# Patient Record
Sex: MALE | Employment: OTHER | URBAN - NONMETROPOLITAN AREA
[De-identification: names, ages, dates, MRNs, and addresses within clinical notes are randomized per-mention and may not be internally consistent; named-entity substitution may affect disease eponyms.]

---

## 2017-03-22 LAB
BASOPHILS ABSOLUTE: ABNORMAL /ΜL
BASOPHILS RELATIVE PERCENT: ABNORMAL %
BUN BLDV-MCNC: 33 MG/DL
CALCIUM SERPL-MCNC: 9.1 MG/DL
CHLORIDE BLD-SCNC: 104 MMOL/L
CO2: 29 MMOL/L
CREAT SERPL-MCNC: 1.36 MG/DL
EOSINOPHILS ABSOLUTE: ABNORMAL /ΜL
EOSINOPHILS RELATIVE PERCENT: ABNORMAL %
GFR CALCULATED: 49
GLUCOSE BLD-MCNC: 128 MG/DL
HCT VFR BLD CALC: 39.7 % (ref 41–53)
HEMOGLOBIN: 13.2 G/DL (ref 13.5–17.5)
LYMPHOCYTES ABSOLUTE: ABNORMAL /ΜL
LYMPHOCYTES RELATIVE PERCENT: ABNORMAL %
MCH RBC QN AUTO: ABNORMAL PG
MCHC RBC AUTO-ENTMCNC: ABNORMAL G/DL
MCV RBC AUTO: ABNORMAL FL
MONOCYTES ABSOLUTE: ABNORMAL /ΜL
MONOCYTES RELATIVE PERCENT: ABNORMAL %
NEUTROPHILS ABSOLUTE: ABNORMAL /ΜL
NEUTROPHILS RELATIVE PERCENT: ABNORMAL %
PLATELET # BLD: 199 K/ΜL
PMV BLD AUTO: ABNORMAL FL
POTASSIUM SERPL-SCNC: 4.3 MMOL/L
RBC # BLD: 4.68 10^6/ΜL
SODIUM BLD-SCNC: 135 MMOL/L
WBC # BLD: 5.88 10^3/ML

## 2017-04-03 ENCOUNTER — OFFICE VISIT (OUTPATIENT)
Dept: NEPHROLOGY | Age: 82
End: 2017-04-03

## 2017-04-03 VITALS
HEIGHT: 67 IN | DIASTOLIC BLOOD PRESSURE: 60 MMHG | SYSTOLIC BLOOD PRESSURE: 118 MMHG | HEART RATE: 62 BPM | RESPIRATION RATE: 18 BRPM | WEIGHT: 144 LBS | BODY MASS INDEX: 22.6 KG/M2 | OXYGEN SATURATION: 96 %

## 2017-04-03 DIAGNOSIS — N18.30 CKD (CHRONIC KIDNEY DISEASE) STAGE 3, GFR 30-59 ML/MIN (HCC): Primary | ICD-10-CM

## 2017-04-03 PROCEDURE — G8427 DOCREV CUR MEDS BY ELIG CLIN: HCPCS | Performed by: INTERNAL MEDICINE

## 2017-04-03 PROCEDURE — 4040F PNEUMOC VAC/ADMIN/RCVD: CPT | Performed by: INTERNAL MEDICINE

## 2017-04-03 PROCEDURE — 1036F TOBACCO NON-USER: CPT | Performed by: INTERNAL MEDICINE

## 2017-04-03 PROCEDURE — G8419 CALC BMI OUT NRM PARAM NOF/U: HCPCS | Performed by: INTERNAL MEDICINE

## 2017-04-03 PROCEDURE — 99213 OFFICE O/P EST LOW 20 MIN: CPT | Performed by: INTERNAL MEDICINE

## 2017-04-03 PROCEDURE — 1123F ACP DISCUSS/DSCN MKR DOCD: CPT | Performed by: INTERNAL MEDICINE

## 2017-04-03 PROCEDURE — G8598 ASA/ANTIPLAT THER USED: HCPCS | Performed by: INTERNAL MEDICINE

## 2017-04-03 RX ORDER — ISOSORBIDE MONONITRATE 120 MG/1
120 TABLET, EXTENDED RELEASE ORAL DAILY
Refills: 4 | COMMUNITY
Start: 2017-03-12

## 2017-05-15 RX ORDER — POTASSIUM CITRATE 10 MEQ/1
TABLET, EXTENDED RELEASE ORAL
Qty: 60 TABLET | Refills: 3 | Status: SHIPPED | OUTPATIENT
Start: 2017-05-15 | End: 2017-05-15 | Stop reason: SDUPTHER

## 2017-05-17 RX ORDER — POTASSIUM CITRATE 10 MEQ/1
TABLET, EXTENDED RELEASE ORAL
Qty: 60 TABLET | Refills: 3 | Status: SHIPPED | OUTPATIENT
Start: 2017-05-17 | End: 2018-01-03 | Stop reason: SDUPTHER

## 2017-09-19 LAB
BASOPHILS ABSOLUTE: ABNORMAL /ΜL
BASOPHILS RELATIVE PERCENT: ABNORMAL %
BUN BLDV-MCNC: 32 MG/DL
CALCIUM SERPL-MCNC: 8.9 MG/DL
CHLORIDE BLD-SCNC: 106 MMOL/L
CO2: 28 MMOL/L
CREAT SERPL-MCNC: 1.39 MG/DL
EOSINOPHILS ABSOLUTE: ABNORMAL /ΜL
EOSINOPHILS RELATIVE PERCENT: ABNORMAL %
GFR CALCULATED: 48
GLUCOSE BLD-MCNC: 150 MG/DL
HCT VFR BLD CALC: 38.6 % (ref 41–53)
HEMOGLOBIN: 13 G/DL (ref 13.5–17.5)
LYMPHOCYTES ABSOLUTE: ABNORMAL /ΜL
LYMPHOCYTES RELATIVE PERCENT: ABNORMAL %
MCH RBC QN AUTO: ABNORMAL PG
MCHC RBC AUTO-ENTMCNC: ABNORMAL G/DL
MCV RBC AUTO: ABNORMAL FL
MONOCYTES ABSOLUTE: ABNORMAL /ΜL
MONOCYTES RELATIVE PERCENT: ABNORMAL %
NEUTROPHILS ABSOLUTE: ABNORMAL /ΜL
NEUTROPHILS RELATIVE PERCENT: ABNORMAL %
PLATELET # BLD: 201 K/ΜL
PMV BLD AUTO: ABNORMAL FL
POTASSIUM SERPL-SCNC: 4.6 MMOL/L
RBC # BLD: 4.55 10^6/ΜL
SODIUM BLD-SCNC: 140 MMOL/L
WBC # BLD: 6.2 10^3/ML

## 2017-10-02 ENCOUNTER — OFFICE VISIT (OUTPATIENT)
Dept: NEPHROLOGY | Age: 82
End: 2017-10-02
Payer: MEDICARE

## 2017-10-02 VITALS
HEART RATE: 61 BPM | WEIGHT: 140 LBS | HEIGHT: 69 IN | RESPIRATION RATE: 18 BRPM | SYSTOLIC BLOOD PRESSURE: 130 MMHG | DIASTOLIC BLOOD PRESSURE: 68 MMHG | OXYGEN SATURATION: 97 % | BODY MASS INDEX: 20.73 KG/M2

## 2017-10-02 DIAGNOSIS — N18.30 CKD (CHRONIC KIDNEY DISEASE), STAGE III (HCC): Primary | ICD-10-CM

## 2017-10-02 PROCEDURE — 1036F TOBACCO NON-USER: CPT | Performed by: INTERNAL MEDICINE

## 2017-10-02 PROCEDURE — G8420 CALC BMI NORM PARAMETERS: HCPCS | Performed by: INTERNAL MEDICINE

## 2017-10-02 PROCEDURE — G8484 FLU IMMUNIZE NO ADMIN: HCPCS | Performed by: INTERNAL MEDICINE

## 2017-10-02 PROCEDURE — G8598 ASA/ANTIPLAT THER USED: HCPCS | Performed by: INTERNAL MEDICINE

## 2017-10-02 PROCEDURE — 4040F PNEUMOC VAC/ADMIN/RCVD: CPT | Performed by: INTERNAL MEDICINE

## 2017-10-02 PROCEDURE — 1123F ACP DISCUSS/DSCN MKR DOCD: CPT | Performed by: INTERNAL MEDICINE

## 2017-10-02 PROCEDURE — 99213 OFFICE O/P EST LOW 20 MIN: CPT | Performed by: INTERNAL MEDICINE

## 2017-10-02 PROCEDURE — G8427 DOCREV CUR MEDS BY ELIG CLIN: HCPCS | Performed by: INTERNAL MEDICINE

## 2017-10-02 NOTE — PROGRESS NOTES
[facility]    [address] [facility] [address]  [address], [address]  [phone]       Progress Note    10/2/2017 3:02 PM    Pt Name:    Aaron Castillo  YOB: 1935  Primary Care Physician:  Diann GONZALES       Chief Complaint:   Chief Complaint   Patient presents with    Chronic Kidney Disease     iii        History of Chief Complaint: CKD stage IIIA from DM and HTN     Subjective:  I last saw the patient in clinic 04/03/17. I follow the patient for Chronic Kidney disease stage IIIA. Since our last visit the patient has not been hospitalized. The patient is sleeping well at night with 1-2 times per night nocturia. The patient has a good appetite and is remaining active. The patient denied N/V/C/D/SOB/CP. EGFR=48 Ml/Min       Objective:  VITALS:  /68 (Site: Left Arm, Position: Sitting, Cuff Size: Small Adult)  Pulse 61  Resp 18  Ht 5' 9\" (1.753 m)  Wt 140 lb (63.5 kg)  SpO2 97%  BMI 20.67 kg/m2  Weight:   Wt Readings from Last 3 Encounters:   10/02/17 140 lb (63.5 kg)   04/03/17 144 lb (65.3 kg)   12/05/16 144 lb (65.3 kg)     Body mass index is 20.67 kg/(m^2). Physical examination    General:  Alert and cooperative with exam  HEENT:  Head: Normocephalic, no lesions, without obvious abnormality. Neck:   No JVD and no bruits. Thyroid gland is normal  Lungs:  clear to auscultation bilaterally  Heart:  regular rate and rhythm, S1, S2 normal, no murmur, click, rub or gallop  Abdomen:  soft, non-tender; bowel sounds normal; no masses,  no organomegaly  Extremities:  extremities normal, atraumatic, no cyanosis or edema  Neurologic:  Mental status: Alert, oriented, thought content appropriate  Skin:                Warm and dry with no rashes. Muscles:         Hand  and leg strength are equal and strong bilaterally.      Lab Data      CBC:   Lab Results   Component Value Date    WBC 6.20 09/19/2017    HGB 13.0 (A) 09/19/2017    HCT 38.6 (A) 09/19/2017     09/19/2017     BMP:    Lab Results   Component Value Date     09/19/2017     03/22/2017     11/17/2016    K 4.6 09/19/2017    K 4.3 03/22/2017    K 4.7 11/17/2016     09/19/2017     03/22/2017     11/17/2016    CO2 28 09/19/2017    CO2 29 03/22/2017    CO2 30 11/17/2016    BUN 32 09/19/2017    BUN 33 03/22/2017    BUN 27 11/17/2016    CREATININE 1.39 09/19/2017    CREATININE 1.36 03/22/2017    CREATININE 1.37 11/17/2016    GLUCOSE 150 09/19/2017    GLUCOSE 128 03/22/2017    GLUCOSE 198 11/17/2016      Hepatic:   Lab Results   Component Value Date    AST 16 11/20/2014    ALT 25 11/20/2014    BILITOT 0.3 11/20/2014    ALKPHOS 117 11/20/2014     BNP: No results found for: BNP  Lipids:   Lab Results   Component Value Date    CHOL 141 11/20/2014    HDL 50 11/20/2014     INR: No results found for: INR  URINE: No results found for: NAUR, PROTUR  No results found for: NITRU, COLORU, PHUR, LABCAST, WBCUA, RBCUA, MUCUS, TRICHOMONAS, YEAST, BACTERIA, CLARITYU, SPECGRAV, LEUKOCYTESUR, UROBILINOGEN, BILIRUBINUR, BLOODU, GLUCOSEU, KETUA, AMORPHOUS   Microalbumen/Creatinine ratio:  No components found for: RUCREAT        Medications:    Current Outpatient Prescriptions   Medication Sig Dispense Refill    potassium citrate (UROCIT-K) 10 MEQ (1080 MG) extended release tablet TAKE 1 TABLET BY MOUTH TWICE A DAY WITH MEALS 60 tablet 3    isosorbide mononitrate (IMDUR) 60 MG extended release tablet 60 mg daily  4    atenolol (TENORMIN) 25 MG tablet Take 25 mg by mouth 2 times daily       magnesium oxide (MAG-OX) 400 MG tablet Take 400 mg by mouth daily      glimepiride (AMARYL) 1 MG tablet Take 1 mg by mouth every morning (before breakfast)      oxybutynin (DITROPAN-XL) 10 MG CR tablet Take 10 mg by mouth daily      nitroGLYCERIN (NITROSTAT) 0.4 MG SL tablet Place 0.4 mg under the tongue every 5 minutes as needed for Chest pain      Cholecalciferol ([facility] VIT D 5000 HIGH-POTENCY PO) Take by

## 2017-10-02 NOTE — MR AVS SNAPSHOT
After Visit Summary             Quan Doyle   10/2/2017 2:20 PM   Office Visit    Description:  Male : 1935   Provider:  Lucinda Nolasco DO   Department:  [address] [place] [address] and Future Appointments         Below is a list of your follow-up and future appointments. This may not be a complete list as you may have made appointments directly with providers that we are not aware of or your providers may have made some for you. Please call your providers to confirm appointments. It is important to keep your appointments. Please bring your current insurance card, photo ID, co-pay, and all medication bottles to your appointment. If self-pay, payment is expected at the time of service. Your To-Do List     Future Appointments Provider Department Dept Phone    2018 1:40 PM [facility]  [phone]    Please arrive 15 minutes prior to appointment time, bring insurance card and photo ID. Please arrive 15 minutes prior to appointment time, bring insurance card and photo ID. Future Orders Complete By Expires    Basic Metabolic Panel [LHP90 Custom]  3/31/2018 10/3/2018    CBC Auto Differential [SOL9228 Custom]  3/31/2018 10/3/2018    Follow-Up    Return in about 6 months (around 2018).          Information from Your Visit        Department     Name Address Phone Fax    [address] [id number] [address] [phone]      You Were Seen for:         Comments    CKD (chronic kidney disease), stage III   [405094]         Vital Signs     Blood Pressure Pulse Respirations Height Weight Oxygen Saturation    130/68 (Site: Left Arm, Position: Sitting, Cuff Size: Small Adult) 61 18 5' 9\" (1.753 m) 140 lb (63.5 kg) 97%    Body Mass Index Smoking Status                20.67 kg/m2 Former Smoker             Medications and Orders      Your Current Medications Are potassium citrate (UROCIT-K) 10 MEQ (1080 MG) extended release tablet TAKE 1 TABLET BY MOUTH TWICE A DAY WITH MEALS    isosorbide mononitrate (IMDUR) 60 MG extended release tablet 60 mg daily    atenolol (TENORMIN) 25 MG tablet Take 25 mg by mouth 2 times daily     magnesium oxide (MAG-OX) 400 MG tablet Take 400 mg by mouth daily    glimepiride (AMARYL) 1 MG tablet Take 1 mg by mouth every morning (before breakfast)    oxybutynin (DITROPAN-XL) 10 MG CR tablet Take 10 mg by mouth daily    nitroGLYCERIN (NITROSTAT) 0.4 MG SL tablet Place 0.4 mg under the tongue every 5 minutes as needed for Chest pain    Cholecalciferol (CVS VIT D 5000 HIGH-POTENCY PO) Take by mouth daily    Multiple Vitamins-Minerals (MULTIVITAMIN PO) Take by mouth daily    pantoprazole (PROTONIX) 40 MG tablet Take 40 mg by mouth 2 times daily     aspirin 81 MG tablet Take 81 mg by mouth daily. loratadine (ALLERGY RELIEF) 10 MG tablet Take 10 mg by mouth daily. metFORMIN (GLUCOPHAGE) 850 MG tablet Take 850 mg by mouth 2 times daily (with meals). finasteride (PROSCAR) 5 MG tablet Take 1 tablet by mouth daily. ferrous sulfate 325 (65 FE) MG tablet Take 325 mg by mouth 2 times daily.     simvastatin (ZOCOR) 40 MG tablet Take 40 mg by mouth nightly    zinc 50 MG CAPS Take 50 mg by mouth daily    tamsulosin (FLOMAX) 0.4 MG capsule TAKE ONE CAPSULE BY MOUTH AT BEDTIME      Allergies              Doxycycline Monohydrate     Tetracycline     Tetracyclines & Related          Additional Information        Basic Information     Date Of Birth Sex Race Ethnicity Preferred Language    1935 Male Declined Declined English      Problem List as of 10/2/2017  Date Reviewed: 10/2/2017                CKD (chronic kidney disease), stage II    Hematuria    DM (diabetes mellitus) ([facility])    S/P appendectomy    Gastric ulcer    S/P rotator cuff repair    Smoking hx    H/O ventral hernia repair    Acute renal insufficiency    Hypoglycemia

## 2018-01-05 RX ORDER — POTASSIUM CITRATE 10 MEQ/1
TABLET, EXTENDED RELEASE ORAL
Qty: 60 TABLET | Refills: 3 | OUTPATIENT
Start: 2018-01-05 | End: 2018-05-02 | Stop reason: SDUPTHER

## 2018-03-27 LAB
BASOPHILS ABSOLUTE: ABNORMAL /ΜL
BASOPHILS RELATIVE PERCENT: ABNORMAL %
BUN BLDV-MCNC: 24 MG/DL
CALCIUM SERPL-MCNC: 8.8 MG/DL
CHLORIDE BLD-SCNC: 105 MMOL/L
CO2: 27 MMOL/L
CREAT SERPL-MCNC: 1.28 MG/DL
EOSINOPHILS ABSOLUTE: ABNORMAL /ΜL
EOSINOPHILS RELATIVE PERCENT: ABNORMAL %
GFR CALCULATED: 52
GLUCOSE BLD-MCNC: 133 MG/DL
HCT VFR BLD CALC: 38.8 % (ref 41–53)
HEMOGLOBIN: 12.8 G/DL (ref 13.5–17.5)
LYMPHOCYTES ABSOLUTE: ABNORMAL /ΜL
LYMPHOCYTES RELATIVE PERCENT: ABNORMAL %
MCH RBC QN AUTO: ABNORMAL PG
MCHC RBC AUTO-ENTMCNC: ABNORMAL G/DL
MCV RBC AUTO: ABNORMAL FL
MONOCYTES ABSOLUTE: ABNORMAL /ΜL
MONOCYTES RELATIVE PERCENT: ABNORMAL %
NEUTROPHILS ABSOLUTE: ABNORMAL /ΜL
NEUTROPHILS RELATIVE PERCENT: ABNORMAL %
PLATELET # BLD: 204 K/ΜL
PMV BLD AUTO: ABNORMAL FL
POTASSIUM SERPL-SCNC: 4.8 MMOL/L
RBC # BLD: 4.49 10^6/ΜL
SODIUM BLD-SCNC: 139 MMOL/L
WBC # BLD: 6.14 10^3/ML

## 2018-04-02 ENCOUNTER — OFFICE VISIT (OUTPATIENT)
Dept: NEPHROLOGY | Age: 83
End: 2018-04-02
Payer: MEDICARE

## 2018-04-02 VITALS
RESPIRATION RATE: 18 BRPM | DIASTOLIC BLOOD PRESSURE: 76 MMHG | HEIGHT: 69 IN | BODY MASS INDEX: 20.44 KG/M2 | HEART RATE: 67 BPM | WEIGHT: 138 LBS | SYSTOLIC BLOOD PRESSURE: 126 MMHG | OXYGEN SATURATION: 97 %

## 2018-04-02 DIAGNOSIS — N18.30 CKD (CHRONIC KIDNEY DISEASE), STAGE III (HCC): Primary | ICD-10-CM

## 2018-04-02 PROCEDURE — G8420 CALC BMI NORM PARAMETERS: HCPCS | Performed by: INTERNAL MEDICINE

## 2018-04-02 PROCEDURE — 99213 OFFICE O/P EST LOW 20 MIN: CPT | Performed by: INTERNAL MEDICINE

## 2018-04-02 PROCEDURE — G8598 ASA/ANTIPLAT THER USED: HCPCS | Performed by: INTERNAL MEDICINE

## 2018-04-02 PROCEDURE — G8428 CUR MEDS NOT DOCUMENT: HCPCS | Performed by: INTERNAL MEDICINE

## 2018-04-02 PROCEDURE — 1123F ACP DISCUSS/DSCN MKR DOCD: CPT | Performed by: INTERNAL MEDICINE

## 2018-04-02 PROCEDURE — 1036F TOBACCO NON-USER: CPT | Performed by: INTERNAL MEDICINE

## 2018-04-02 PROCEDURE — 4040F PNEUMOC VAC/ADMIN/RCVD: CPT | Performed by: INTERNAL MEDICINE

## 2018-04-02 RX ORDER — LORATADINE 10 MG/1
10 CAPSULE, LIQUID FILLED ORAL DAILY
COMMUNITY
End: 2019-07-17 | Stop reason: ALTCHOICE

## 2018-05-03 RX ORDER — POTASSIUM CITRATE 10 MEQ/1
TABLET, EXTENDED RELEASE ORAL
Qty: 60 TABLET | Refills: 3 | Status: SHIPPED | OUTPATIENT
Start: 2018-05-03 | End: 2018-08-31 | Stop reason: SDUPTHER

## 2018-06-08 LAB
CREATININE, URINE: NORMAL
MICROALBUMIN/CREAT 24H UR: 5.2 MG/G{CREAT}
MICROALBUMIN/CREAT UR-RTO: NORMAL

## 2018-09-05 RX ORDER — POTASSIUM CITRATE 10 MEQ/1
TABLET, EXTENDED RELEASE ORAL
Qty: 60 TABLET | Refills: 3 | Status: SHIPPED | OUTPATIENT
Start: 2018-09-05 | End: 2018-12-29 | Stop reason: SDUPTHER

## 2018-10-06 LAB
ALBUMIN SERPL-MCNC: 4 G/DL
ALP BLD-CCNC: 88 U/L
ALT SERPL-CCNC: 30 U/L
ANION GAP SERPL CALCULATED.3IONS-SCNC: NORMAL MMOL/L
AST SERPL-CCNC: 18 U/L
BILIRUB SERPL-MCNC: 0.2 MG/DL (ref 0.1–1.4)
BUN BLDV-MCNC: 35 MG/DL
CALCIUM SERPL-MCNC: 8.9 MG/DL
CHLORIDE BLD-SCNC: 102 MMOL/L
CHOLESTEROL, TOTAL: 133 MG/DL
CHOLESTEROL/HDL RATIO: NORMAL
CO2: 27 MMOL/L
CREAT SERPL-MCNC: 1.57 MG/DL
GFR CALCULATED: 43
GLUCOSE BLD-MCNC: 171 MG/DL
HDLC SERPL-MCNC: 58 MG/DL (ref 35–70)
LDL CHOLESTEROL CALCULATED: 75 MG/DL (ref 0–160)
POTASSIUM SERPL-SCNC: 5.2 MMOL/L
SODIUM BLD-SCNC: 136 MMOL/L
TOTAL PROTEIN: 6.3
TRIGL SERPL-MCNC: 89 MG/DL
VLDLC SERPL CALC-MCNC: 17 MG/DL

## 2018-11-29 LAB
BASOPHILS ABSOLUTE: ABNORMAL /ΜL
BASOPHILS RELATIVE PERCENT: ABNORMAL %
BUN BLDV-MCNC: 37 MG/DL
CALCIUM SERPL-MCNC: 8.6 MG/DL
CHLORIDE BLD-SCNC: 105 MMOL/L
CO2: 28 MMOL/L
CREAT SERPL-MCNC: 1.6 MG/DL
EOSINOPHILS ABSOLUTE: ABNORMAL /ΜL
EOSINOPHILS RELATIVE PERCENT: ABNORMAL %
GFR CALCULATED: 40
GLUCOSE BLD-MCNC: 194 MG/DL
HCT VFR BLD CALC: 39.1 % (ref 41–53)
HEMOGLOBIN: 12.8 G/DL (ref 13.5–17.5)
LYMPHOCYTES ABSOLUTE: ABNORMAL /ΜL
LYMPHOCYTES RELATIVE PERCENT: ABNORMAL %
MCH RBC QN AUTO: ABNORMAL PG
MCHC RBC AUTO-ENTMCNC: ABNORMAL G/DL
MCV RBC AUTO: ABNORMAL FL
MONOCYTES ABSOLUTE: ABNORMAL /ΜL
MONOCYTES RELATIVE PERCENT: ABNORMAL %
NEUTROPHILS ABSOLUTE: ABNORMAL /ΜL
NEUTROPHILS RELATIVE PERCENT: ABNORMAL %
PLATELET # BLD: 208 K/ΜL
PMV BLD AUTO: ABNORMAL FL
POTASSIUM SERPL-SCNC: 4.8 MMOL/L
RBC # BLD: 4.5 10^6/ΜL
SODIUM BLD-SCNC: 141 MMOL/L
WBC # BLD: 5.96 10^3/ML

## 2018-12-19 ENCOUNTER — OFFICE VISIT (OUTPATIENT)
Dept: NEPHROLOGY | Age: 83
End: 2018-12-19
Payer: MEDICARE

## 2018-12-19 VITALS
SYSTOLIC BLOOD PRESSURE: 138 MMHG | HEIGHT: 69 IN | DIASTOLIC BLOOD PRESSURE: 72 MMHG | HEART RATE: 61 BPM | RESPIRATION RATE: 18 BRPM | BODY MASS INDEX: 22.66 KG/M2 | WEIGHT: 153 LBS | OXYGEN SATURATION: 92 %

## 2018-12-19 DIAGNOSIS — N18.30 CKD (CHRONIC KIDNEY DISEASE), STAGE III (HCC): Primary | ICD-10-CM

## 2018-12-19 PROCEDURE — 99213 OFFICE O/P EST LOW 20 MIN: CPT | Performed by: INTERNAL MEDICINE

## 2018-12-19 PROCEDURE — G8484 FLU IMMUNIZE NO ADMIN: HCPCS | Performed by: INTERNAL MEDICINE

## 2018-12-19 PROCEDURE — 1101F PT FALLS ASSESS-DOCD LE1/YR: CPT | Performed by: INTERNAL MEDICINE

## 2018-12-19 PROCEDURE — G8420 CALC BMI NORM PARAMETERS: HCPCS | Performed by: INTERNAL MEDICINE

## 2018-12-19 PROCEDURE — 4040F PNEUMOC VAC/ADMIN/RCVD: CPT | Performed by: INTERNAL MEDICINE

## 2018-12-19 PROCEDURE — 1123F ACP DISCUSS/DSCN MKR DOCD: CPT | Performed by: INTERNAL MEDICINE

## 2018-12-19 PROCEDURE — 1036F TOBACCO NON-USER: CPT | Performed by: INTERNAL MEDICINE

## 2018-12-19 PROCEDURE — G8598 ASA/ANTIPLAT THER USED: HCPCS | Performed by: INTERNAL MEDICINE

## 2018-12-19 PROCEDURE — G8427 DOCREV CUR MEDS BY ELIG CLIN: HCPCS | Performed by: INTERNAL MEDICINE

## 2018-12-19 RX ORDER — LISINOPRIL 2.5 MG/1
2.5 TABLET ORAL DAILY
COMMUNITY
End: 2020-05-20

## 2018-12-19 NOTE — PROGRESS NOTES
[facility]    [address] [facility]  [address], [address]  [phone]       Progress Note    12/19/2018 11:25 AM    Pt Name:    Roxy Bains  YOB: 1935  Primary Care Physician:  Elbert GONZALES       Chief Complaint:   Chief Complaint   Patient presents with    Chronic Kidney Disease     iii        History of Chief Complaint: CKD stage IIIB from DM and HTN     Subjective:  I last saw the patient in clinic 04/02/18. I follow the patient for Chronic Kidney disease stage IIIB. Since our last visit the patient has been hospitalized. The patient is sleeping well at night with 2-3 times per night nocturia. The patient has a fair appetite and is remaining active. The patient denied N/V/C/D/SOB/CP. He has had watery diarrhea for 2 months without treatment. He was also started on lisinopril. Last six eGFR readings:  Lab Results   Component Value Date    LABGLOM 40 11/29/2018    LABGLOM 43 10/06/2018    LABGLOM 52 03/27/2018    LABGLOM 48 09/19/2017    LABGLOM 49 03/22/2017    LABGLOM 49 11/17/2016          Objective:  VITALS:  /72 (Site: Right Upper Arm, Position: Sitting, Cuff Size: Small Adult)   Pulse 61   Resp 18   Ht 5' 9\" (1.753 m)   Wt 153 lb (69.4 kg)   SpO2 92%   BMI 22.59 kg/m²   Weight:   Wt Readings from Last 3 Encounters:   12/19/18 153 lb (69.4 kg)   04/02/18 138 lb (62.6 kg)   10/02/17 140 lb (63.5 kg)     Body mass index is 22.59 kg/m². Physical examination    General:  Alert and cooperative with exam  HEENT:  Head: Normocephalic, no lesions, without obvious abnormality. Neck:   No JVD and no bruits.  Thyroid gland is normal  Lungs:  clear to auscultation bilaterally  Heart:  regular rate and rhythm, S1, S2 normal, no murmur, click, rub or gallop  Abdomen:  soft, non-tender; bowel sounds normal; no masses,  no organomegaly  Extremities:  extremities normal, atraumatic, no cyanosis or edema  Neurologic:  Mental status: Alert, oriented, thought daily      isosorbide mononitrate (IMDUR) 120 MG extended release tablet 120 mg daily   4    atenolol (TENORMIN) 50 MG tablet Take 50 mg by mouth daily       magnesium oxide (MAG-OX) 400 MG tablet Take 400 mg by mouth daily      simvastatin (ZOCOR) 40 MG tablet Take 40 mg by mouth nightly      nitroGLYCERIN (NITROSTAT) 0.4 MG SL tablet Place 0.4 mg under the tongue every 5 minutes as needed for Chest pain      Cholecalciferol (CVS VIT D 5000 HIGH-POTENCY PO) Take by mouth daily      zinc 50 MG CAPS Take 50 mg by mouth daily      Multiple Vitamins-Minerals (MULTIVITAMIN PO) Take by mouth daily      pantoprazole (PROTONIX) 40 MG tablet Take 40 mg by mouth 2 times daily       aspirin 81 MG tablet Take 81 mg by mouth daily.  metFORMIN (GLUCOPHAGE) 850 MG tablet Take 850 mg by mouth 2 times daily (with meals).  finasteride (PROSCAR) 5 MG tablet Take 1 tablet by mouth daily. 30 tablet 11    ferrous sulfate 325 (65 FE) MG tablet Take 325 mg by mouth 2 times daily.  tamsulosin (FLOMAX) 0.4 MG capsule TAKE ONE CAPSULE BY MOUTH AT BEDTIME 90 capsule 3     No current facility-administered medications for this visit. IMPRESSIONS:      Kidney disease: acute kidney injury from diarrhea and ACEI. CKD  Anemia: stable  Bone and mineral metabolism: stable       PLAN:  1. We discussed the eGFR today. 2. We will continue all current medications without changes. 3. He will drink more fluids  4. We will see the patient back in 1 months.               _________________________________  Tr Ervin.  DO [place]  [facility]      CC  JUSTIN GONZALES

## 2019-01-03 RX ORDER — POTASSIUM CITRATE 10 MEQ/1
TABLET, EXTENDED RELEASE ORAL
Qty: 60 TABLET | Refills: 3 | Status: SHIPPED | OUTPATIENT
Start: 2019-01-03 | End: 2019-04-17 | Stop reason: SDUPTHER

## 2019-01-08 PROBLEM — R07.9 CHEST PAIN OF UNCERTAIN ETIOLOGY: Status: ACTIVE | Noted: 2017-12-04

## 2019-01-11 LAB
BASOPHILS ABSOLUTE: ABNORMAL /ΜL
BASOPHILS RELATIVE PERCENT: ABNORMAL %
EOSINOPHILS ABSOLUTE: ABNORMAL /ΜL
EOSINOPHILS RELATIVE PERCENT: ABNORMAL %
HCT VFR BLD CALC: 39.2 % (ref 41–53)
HEMOGLOBIN: 13 G/DL (ref 13.5–17.5)
LYMPHOCYTES ABSOLUTE: ABNORMAL /ΜL
LYMPHOCYTES RELATIVE PERCENT: ABNORMAL %
MCH RBC QN AUTO: ABNORMAL PG
MCHC RBC AUTO-ENTMCNC: ABNORMAL G/DL
MCV RBC AUTO: ABNORMAL FL
MONOCYTES ABSOLUTE: ABNORMAL /ΜL
MONOCYTES RELATIVE PERCENT: ABNORMAL %
NEUTROPHILS ABSOLUTE: ABNORMAL /ΜL
NEUTROPHILS RELATIVE PERCENT: ABNORMAL %
PLATELET # BLD: 170 K/ΜL
PMV BLD AUTO: ABNORMAL FL
RBC # BLD: 4.56 10^6/ΜL
WBC # BLD: 3.62 10^3/ML

## 2019-01-16 ENCOUNTER — OFFICE VISIT (OUTPATIENT)
Dept: NEPHROLOGY | Age: 84
End: 2019-01-16
Payer: MEDICARE

## 2019-01-16 VITALS
HEIGHT: 69 IN | SYSTOLIC BLOOD PRESSURE: 136 MMHG | BODY MASS INDEX: 22.51 KG/M2 | OXYGEN SATURATION: 98 % | WEIGHT: 152 LBS | DIASTOLIC BLOOD PRESSURE: 76 MMHG | HEART RATE: 84 BPM

## 2019-01-16 DIAGNOSIS — N18.4 CKD (CHRONIC KIDNEY DISEASE), STAGE IV (HCC): Primary | ICD-10-CM

## 2019-01-16 PROCEDURE — 1036F TOBACCO NON-USER: CPT | Performed by: INTERNAL MEDICINE

## 2019-01-16 PROCEDURE — G8427 DOCREV CUR MEDS BY ELIG CLIN: HCPCS | Performed by: INTERNAL MEDICINE

## 2019-01-16 PROCEDURE — 4040F PNEUMOC VAC/ADMIN/RCVD: CPT | Performed by: INTERNAL MEDICINE

## 2019-01-16 PROCEDURE — 1101F PT FALLS ASSESS-DOCD LE1/YR: CPT | Performed by: INTERNAL MEDICINE

## 2019-01-16 PROCEDURE — 1123F ACP DISCUSS/DSCN MKR DOCD: CPT | Performed by: INTERNAL MEDICINE

## 2019-01-16 PROCEDURE — 99213 OFFICE O/P EST LOW 20 MIN: CPT | Performed by: INTERNAL MEDICINE

## 2019-01-16 PROCEDURE — G8484 FLU IMMUNIZE NO ADMIN: HCPCS | Performed by: INTERNAL MEDICINE

## 2019-01-16 PROCEDURE — G8598 ASA/ANTIPLAT THER USED: HCPCS | Performed by: INTERNAL MEDICINE

## 2019-01-16 PROCEDURE — G8420 CALC BMI NORM PARAMETERS: HCPCS | Performed by: INTERNAL MEDICINE

## 2019-02-11 ENCOUNTER — TELEPHONE (OUTPATIENT)
Dept: NEPHROLOGY | Age: 84
End: 2019-02-11

## 2019-03-16 LAB
BASOPHILS ABSOLUTE: ABNORMAL /ΜL
BASOPHILS RELATIVE PERCENT: ABNORMAL %
BUN BLDV-MCNC: 26 MG/DL
CALCIUM SERPL-MCNC: 9 MG/DL
CHLORIDE BLD-SCNC: 107 MMOL/L
CO2: 27 MMOL/L
CREAT SERPL-MCNC: 1.57 MG/DL
EOSINOPHILS ABSOLUTE: ABNORMAL /ΜL
EOSINOPHILS RELATIVE PERCENT: ABNORMAL %
GFR CALCULATED: 40
GLUCOSE BLD-MCNC: 182 MG/DL
HCT VFR BLD CALC: 37 % (ref 41–53)
HEMOGLOBIN: 12.2 G/DL (ref 13.5–17.5)
LYMPHOCYTES ABSOLUTE: ABNORMAL /ΜL
LYMPHOCYTES RELATIVE PERCENT: ABNORMAL %
MCH RBC QN AUTO: ABNORMAL PG
MCHC RBC AUTO-ENTMCNC: ABNORMAL G/DL
MCV RBC AUTO: ABNORMAL FL
MONOCYTES ABSOLUTE: ABNORMAL /ΜL
MONOCYTES RELATIVE PERCENT: ABNORMAL %
NEUTROPHILS ABSOLUTE: ABNORMAL /ΜL
NEUTROPHILS RELATIVE PERCENT: ABNORMAL %
PLATELET # BLD: 210 K/ΜL
PMV BLD AUTO: ABNORMAL FL
POTASSIUM SERPL-SCNC: 4.5 MMOL/L
RBC # BLD: 4.35 10^6/ΜL
SODIUM BLD-SCNC: 140 MMOL/L
WBC # BLD: 5.7 10^3/ML

## 2019-03-20 ENCOUNTER — OFFICE VISIT (OUTPATIENT)
Dept: NEPHROLOGY | Age: 84
End: 2019-03-20
Payer: MEDICARE

## 2019-03-20 VITALS
HEART RATE: 74 BPM | SYSTOLIC BLOOD PRESSURE: 136 MMHG | OXYGEN SATURATION: 98 % | WEIGHT: 151 LBS | HEIGHT: 69 IN | RESPIRATION RATE: 18 BRPM | DIASTOLIC BLOOD PRESSURE: 82 MMHG | BODY MASS INDEX: 22.36 KG/M2

## 2019-03-20 DIAGNOSIS — N18.30 CKD (CHRONIC KIDNEY DISEASE), STAGE III (HCC): Primary | ICD-10-CM

## 2019-03-20 PROCEDURE — G8427 DOCREV CUR MEDS BY ELIG CLIN: HCPCS | Performed by: INTERNAL MEDICINE

## 2019-03-20 PROCEDURE — G8420 CALC BMI NORM PARAMETERS: HCPCS | Performed by: INTERNAL MEDICINE

## 2019-03-20 PROCEDURE — G8484 FLU IMMUNIZE NO ADMIN: HCPCS | Performed by: INTERNAL MEDICINE

## 2019-03-20 PROCEDURE — 99213 OFFICE O/P EST LOW 20 MIN: CPT | Performed by: INTERNAL MEDICINE

## 2019-03-20 PROCEDURE — 1123F ACP DISCUSS/DSCN MKR DOCD: CPT | Performed by: INTERNAL MEDICINE

## 2019-03-20 PROCEDURE — 1036F TOBACCO NON-USER: CPT | Performed by: INTERNAL MEDICINE

## 2019-03-20 PROCEDURE — 1101F PT FALLS ASSESS-DOCD LE1/YR: CPT | Performed by: INTERNAL MEDICINE

## 2019-03-20 PROCEDURE — 4040F PNEUMOC VAC/ADMIN/RCVD: CPT | Performed by: INTERNAL MEDICINE

## 2019-03-20 PROCEDURE — G8598 ASA/ANTIPLAT THER USED: HCPCS | Performed by: INTERNAL MEDICINE

## 2019-03-20 RX ORDER — SIMVASTATIN 40 MG
40 TABLET ORAL DAILY
COMMUNITY
End: 2019-03-20 | Stop reason: SDUPTHER

## 2019-04-17 RX ORDER — POTASSIUM CITRATE 10 MEQ/1
10 TABLET, EXTENDED RELEASE ORAL 2 TIMES DAILY
Qty: 60 TABLET | Refills: 3 | Status: SHIPPED | OUTPATIENT
Start: 2019-04-17 | End: 2020-01-15 | Stop reason: SDUPTHER

## 2019-04-25 ENCOUNTER — TELEPHONE (OUTPATIENT)
Dept: NEPHROLOGY | Age: 84
End: 2019-04-25

## 2019-07-02 LAB
BASOPHILS ABSOLUTE: ABNORMAL /ΜL
BASOPHILS RELATIVE PERCENT: ABNORMAL %
BUN BLDV-MCNC: 164 MG/DL
CALCIUM SERPL-MCNC: 8.9 MG/DL
CHLORIDE BLD-SCNC: 108 MMOL/L
CO2: 26 MMOL/L
CREAT SERPL-MCNC: 39 MG/DL
EOSINOPHILS ABSOLUTE: ABNORMAL /ΜL
EOSINOPHILS RELATIVE PERCENT: ABNORMAL %
GFR CALCULATED: 38
GLUCOSE BLD-MCNC: 164 MG/DL
HCT VFR BLD CALC: 39.4 % (ref 41–53)
HEMOGLOBIN: 13.1 G/DL (ref 13.5–17.5)
LYMPHOCYTES ABSOLUTE: ABNORMAL /ΜL
LYMPHOCYTES RELATIVE PERCENT: ABNORMAL %
MCH RBC QN AUTO: ABNORMAL PG
MCHC RBC AUTO-ENTMCNC: ABNORMAL G/DL
MCV RBC AUTO: ABNORMAL FL
MONOCYTES ABSOLUTE: ABNORMAL /ΜL
MONOCYTES RELATIVE PERCENT: ABNORMAL %
NEUTROPHILS ABSOLUTE: ABNORMAL /ΜL
NEUTROPHILS RELATIVE PERCENT: ABNORMAL %
PLATELET # BLD: 176 K/ΜL
PMV BLD AUTO: ABNORMAL FL
POTASSIUM SERPL-SCNC: 4.9 MMOL/L
RBC # BLD: 4.64 10^6/ΜL
SODIUM BLD-SCNC: 140 MMOL/L
WBC # BLD: 5.25 10^3/ML

## 2019-07-17 ENCOUNTER — OFFICE VISIT (OUTPATIENT)
Dept: NEPHROLOGY | Age: 84
End: 2019-07-17
Payer: MEDICARE

## 2019-07-17 VITALS
HEART RATE: 65 BPM | OXYGEN SATURATION: 90 % | DIASTOLIC BLOOD PRESSURE: 74 MMHG | SYSTOLIC BLOOD PRESSURE: 128 MMHG | BODY MASS INDEX: 23.25 KG/M2 | RESPIRATION RATE: 18 BRPM | WEIGHT: 157 LBS | HEIGHT: 69 IN

## 2019-07-17 DIAGNOSIS — N18.30 CKD (CHRONIC KIDNEY DISEASE), STAGE III (HCC): Primary | ICD-10-CM

## 2019-07-17 PROCEDURE — G8420 CALC BMI NORM PARAMETERS: HCPCS | Performed by: INTERNAL MEDICINE

## 2019-07-17 PROCEDURE — 1036F TOBACCO NON-USER: CPT | Performed by: INTERNAL MEDICINE

## 2019-07-17 PROCEDURE — 1123F ACP DISCUSS/DSCN MKR DOCD: CPT | Performed by: INTERNAL MEDICINE

## 2019-07-17 PROCEDURE — 4040F PNEUMOC VAC/ADMIN/RCVD: CPT | Performed by: INTERNAL MEDICINE

## 2019-07-17 PROCEDURE — G8598 ASA/ANTIPLAT THER USED: HCPCS | Performed by: INTERNAL MEDICINE

## 2019-07-17 PROCEDURE — 99213 OFFICE O/P EST LOW 20 MIN: CPT | Performed by: INTERNAL MEDICINE

## 2019-07-17 PROCEDURE — G8427 DOCREV CUR MEDS BY ELIG CLIN: HCPCS | Performed by: INTERNAL MEDICINE

## 2019-07-17 NOTE — PROGRESS NOTES
[facility]    [address] [facility] [address]  [address], [address]  [phone]       Progress Note    7/17/2019 11:18 AM    Pt Name:    Fabian Marsh  YOB: 1935  Primary Care Physician:  Lea GONZALES       Chief Complaint:   Chief Complaint   Patient presents with    Chronic Kidney Disease     iii    Diabetes    Hypertension        History of Chief Complaint: CKD stage IIIB from DM and HTN     Subjective:  I last saw the patient in clinic 03/20/19. I follow the patient for Chronic Kidney disease stage IIIB. Since our last visit the patient has been hospitalized for TURP-laser. The patient is sleeping well at night with 1-2 times per night nocturia. The patient has a good appetite and is remaining active. The patient denied N/V/C/D/SOB/CP. He still has bladder leakage. He empties his bladder every two hours. Last six eGFR readings:  Lab Results   Component Value Date    LABGLOM 38 07/02/2019    LABGLOM 40 03/16/2019    LABGLOM 40 11/29/2018    LABGLOM 43 10/06/2018    LABGLOM 52 03/27/2018    LABGLOM 48 09/19/2017          Objective:  VITALS:  /74 (Site: Right Upper Arm, Position: Sitting, Cuff Size: Small Adult)   Pulse 65   Resp 18   Ht 5' 9\" (1.753 m)   Wt 157 lb (71.2 kg)   SpO2 90%   BMI 23.18 kg/m²   Weight:   Wt Readings from Last 3 Encounters:   07/17/19 157 lb (71.2 kg)   03/20/19 151 lb (68.5 kg)   01/16/19 152 lb (68.9 kg)     Body mass index is 23.18 kg/m². Physical examination    General:  Alert and cooperative with exam  HEENT:  Normocephalic. No lesions nor rashes. CARL. EOMI  Neck:   No JVD and no bruits. Thyroid gland is normal  Lungs:  Breathing easily. No rales nor rhonchi. No cough nor sputum production  Heart[de-identified]            RRR. No murmurs nor rubs. PMI is not enlarged nor displaced. Abdomen:  Soft and non tender. Bowel sounds are active in all four quadrants.    Extremities:  No edema  Neurologic:    Skin:                Warm and CN

## 2019-09-06 RX ORDER — POTASSIUM CITRATE 10 MEQ/1
TABLET, EXTENDED RELEASE ORAL
Qty: 60 TABLET | Refills: 5 | Status: SHIPPED | OUTPATIENT
Start: 2019-09-06 | End: 2020-01-15 | Stop reason: SDUPTHER

## 2020-01-04 LAB
BASOPHILS ABSOLUTE: NORMAL
BASOPHILS RELATIVE PERCENT: NORMAL
EOSINOPHILS ABSOLUTE: NORMAL
EOSINOPHILS RELATIVE PERCENT: NORMAL
HCT VFR BLD CALC: 41.9 % (ref 41–53)
HEMOGLOBIN: 13.7 G/DL (ref 13.5–17.5)
LYMPHOCYTES ABSOLUTE: NORMAL
LYMPHOCYTES RELATIVE PERCENT: NORMAL
MCH RBC QN AUTO: NORMAL PG
MCHC RBC AUTO-ENTMCNC: NORMAL G/DL
MCV RBC AUTO: NORMAL FL
MONOCYTES ABSOLUTE: NORMAL
MONOCYTES RELATIVE PERCENT: NORMAL
NEUTROPHILS ABSOLUTE: NORMAL
NEUTROPHILS RELATIVE PERCENT: NORMAL
PLATELET # BLD: 211 K/ΜL
PMV BLD AUTO: NORMAL FL
RBC # BLD: 4.89 10^6/ΜL
WBC # BLD: 5.98 10^3/ML

## 2020-01-09 LAB
BUN BLDV-MCNC: 35 MG/DL
CALCIUM SERPL-MCNC: 8.6 MG/DL
CHLORIDE BLD-SCNC: 111 MMOL/L
CHOLESTEROL, TOTAL: 137 MG/DL
CHOLESTEROL/HDL RATIO: NORMAL
CO2: 26 MMOL/L
CREAT SERPL-MCNC: 1.62 MG/DL
GFR CALCULATED: 39
GLUCOSE BLD-MCNC: 169 MG/DL
HDLC SERPL-MCNC: 56 MG/DL (ref 35–70)
LDL CHOLESTEROL CALCULATED: 62 MG/DL (ref 0–160)
POTASSIUM SERPL-SCNC: 4.5 MMOL/L
SODIUM BLD-SCNC: 141 MMOL/L
TRIGL SERPL-MCNC: 94 MG/DL
VLDLC SERPL CALC-MCNC: NORMAL MG/DL

## 2020-01-15 ENCOUNTER — OFFICE VISIT (OUTPATIENT)
Dept: NEPHROLOGY | Age: 85
End: 2020-01-15
Payer: MEDICARE

## 2020-01-15 VITALS
RESPIRATION RATE: 18 BRPM | DIASTOLIC BLOOD PRESSURE: 70 MMHG | BODY MASS INDEX: 23.7 KG/M2 | HEART RATE: 66 BPM | WEIGHT: 160 LBS | SYSTOLIC BLOOD PRESSURE: 118 MMHG | OXYGEN SATURATION: 90 % | HEIGHT: 69 IN

## 2020-01-15 PROCEDURE — G8484 FLU IMMUNIZE NO ADMIN: HCPCS | Performed by: INTERNAL MEDICINE

## 2020-01-15 PROCEDURE — 4040F PNEUMOC VAC/ADMIN/RCVD: CPT | Performed by: INTERNAL MEDICINE

## 2020-01-15 PROCEDURE — G8427 DOCREV CUR MEDS BY ELIG CLIN: HCPCS | Performed by: INTERNAL MEDICINE

## 2020-01-15 PROCEDURE — G8420 CALC BMI NORM PARAMETERS: HCPCS | Performed by: INTERNAL MEDICINE

## 2020-01-15 PROCEDURE — 1036F TOBACCO NON-USER: CPT | Performed by: INTERNAL MEDICINE

## 2020-01-15 PROCEDURE — 1123F ACP DISCUSS/DSCN MKR DOCD: CPT | Performed by: INTERNAL MEDICINE

## 2020-01-15 PROCEDURE — 99213 OFFICE O/P EST LOW 20 MIN: CPT | Performed by: INTERNAL MEDICINE

## 2020-01-15 RX ORDER — PRAVASTATIN SODIUM 40 MG
40 TABLET ORAL DAILY
COMMUNITY

## 2020-01-15 RX ORDER — AMLODIPINE BESYLATE 5 MG/1
5 TABLET ORAL DAILY
COMMUNITY
End: 2020-08-03 | Stop reason: SINTOL

## 2020-01-15 RX ORDER — POTASSIUM CITRATE 10 MEQ/1
10 TABLET, EXTENDED RELEASE ORAL 2 TIMES DAILY
Qty: 60 TABLET | Refills: 5 | Status: SHIPPED | OUTPATIENT
Start: 2020-01-15 | End: 2020-08-17 | Stop reason: SDUPTHER

## 2020-01-15 RX ORDER — GLIMEPIRIDE 4 MG/1
4 TABLET ORAL
COMMUNITY

## 2020-05-06 LAB
BASOPHILS ABSOLUTE: 0.04 /ΜL
BASOPHILS RELATIVE PERCENT: 0.7 %
BUN BLDV-MCNC: 42 MG/DL
CALCIUM SERPL-MCNC: 8.9 MG/DL
CHLORIDE BLD-SCNC: 111 MMOL/L
CO2: 25 MMOL/L
CREAT SERPL-MCNC: 1.59 MG/DL
EOSINOPHILS ABSOLUTE: 0.23 /ΜL
EOSINOPHILS RELATIVE PERCENT: 4.3 %
GFR CALCULATED: 40
GLUCOSE BLD-MCNC: 168 MG/DL
HCT VFR BLD CALC: 39.4 % (ref 41–53)
HEMOGLOBIN: 13.1 G/DL (ref 13.5–17.5)
LYMPHOCYTES ABSOLUTE: 1.18 /ΜL
LYMPHOCYTES RELATIVE PERCENT: 21.9 %
MCH RBC QN AUTO: 29 PG
MCHC RBC AUTO-ENTMCNC: 33.2 G/DL
MCV RBC AUTO: 87.2 FL
MONOCYTES ABSOLUTE: 0.7 /ΜL
MONOCYTES RELATIVE PERCENT: 13 %
NEUTROPHILS ABSOLUTE: 3.21 /ΜL
NEUTROPHILS RELATIVE PERCENT: 59.4 %
PLATELET # BLD: 208 K/ΜL
PMV BLD AUTO: 10.2 FL
POTASSIUM SERPL-SCNC: 4.6 MMOL/L
RBC # BLD: 4.52 10^6/ΜL
SODIUM BLD-SCNC: 141 MMOL/L
WBC # BLD: 5.4 10^3/ML

## 2020-05-20 ENCOUNTER — OFFICE VISIT (OUTPATIENT)
Dept: NEPHROLOGY | Age: 85
End: 2020-05-20
Payer: MEDICARE

## 2020-05-20 VITALS
OXYGEN SATURATION: 98 % | WEIGHT: 160 LBS | HEART RATE: 62 BPM | DIASTOLIC BLOOD PRESSURE: 68 MMHG | BODY MASS INDEX: 23.63 KG/M2 | SYSTOLIC BLOOD PRESSURE: 128 MMHG

## 2020-05-20 LAB
BUN BLDV-MCNC: 32 MG/DL
C-PEPTIDE: 3.99
CALCIUM SERPL-MCNC: 9.2 MG/DL
CHLORIDE BLD-SCNC: 107 MMOL/L
CO2: 29 MMOL/L
CREAT SERPL-MCNC: 1.57 MG/DL
GFR CALCULATED: 40
GLUCOSE BLD-MCNC: 197 MG/DL
POTASSIUM SERPL-SCNC: 5 MMOL/L
SODIUM BLD-SCNC: 139 MMOL/L

## 2020-05-20 PROCEDURE — 4040F PNEUMOC VAC/ADMIN/RCVD: CPT | Performed by: INTERNAL MEDICINE

## 2020-05-20 PROCEDURE — G8420 CALC BMI NORM PARAMETERS: HCPCS | Performed by: INTERNAL MEDICINE

## 2020-05-20 PROCEDURE — 1036F TOBACCO NON-USER: CPT | Performed by: INTERNAL MEDICINE

## 2020-05-20 PROCEDURE — G8427 DOCREV CUR MEDS BY ELIG CLIN: HCPCS | Performed by: INTERNAL MEDICINE

## 2020-05-20 PROCEDURE — 1123F ACP DISCUSS/DSCN MKR DOCD: CPT | Performed by: INTERNAL MEDICINE

## 2020-05-20 PROCEDURE — 99213 OFFICE O/P EST LOW 20 MIN: CPT | Performed by: INTERNAL MEDICINE

## 2020-06-03 ENCOUNTER — OFFICE VISIT (OUTPATIENT)
Dept: NEPHROLOGY | Age: 85
End: 2020-06-03
Payer: MEDICARE

## 2020-06-03 VITALS
TEMPERATURE: 98.4 F | OXYGEN SATURATION: 95 % | BODY MASS INDEX: 23.7 KG/M2 | HEART RATE: 64 BPM | WEIGHT: 160 LBS | SYSTOLIC BLOOD PRESSURE: 126 MMHG | DIASTOLIC BLOOD PRESSURE: 70 MMHG | HEIGHT: 69 IN

## 2020-06-03 PROCEDURE — 4040F PNEUMOC VAC/ADMIN/RCVD: CPT | Performed by: INTERNAL MEDICINE

## 2020-06-03 PROCEDURE — G8420 CALC BMI NORM PARAMETERS: HCPCS | Performed by: INTERNAL MEDICINE

## 2020-06-03 PROCEDURE — G8427 DOCREV CUR MEDS BY ELIG CLIN: HCPCS | Performed by: INTERNAL MEDICINE

## 2020-06-03 PROCEDURE — 1036F TOBACCO NON-USER: CPT | Performed by: INTERNAL MEDICINE

## 2020-06-03 PROCEDURE — 99213 OFFICE O/P EST LOW 20 MIN: CPT | Performed by: INTERNAL MEDICINE

## 2020-06-03 PROCEDURE — 1123F ACP DISCUSS/DSCN MKR DOCD: CPT | Performed by: INTERNAL MEDICINE

## 2020-06-03 RX ORDER — LOSARTAN POTASSIUM 25 MG/1
12.5 TABLET ORAL DAILY
Qty: 90 TABLET | Refills: 1 | Status: SHIPPED | OUTPATIENT
Start: 2020-06-03 | End: 2021-05-07

## 2020-06-03 NOTE — PATIENT INSTRUCTIONS
KNOW YOUR KIDNEY NUMBERS    Your kidney speed (eGFR) was 40 ml/min this visit (normal is  ml/min)(Ml/min=milliliters of blood filtered per minute). The higher this number is, the better your kidney function is. Your serum creatinine was 1.57 (normal 0.8-1.2 mg/dl at most labs). The higher this number is, the worse your kidney function is. You are in stage G-IIIB-A1 of chronic kidney disease. Your kidney function has remained stable as compared to your last visit. Stages of kidney disease    EGFR (estimated glomerular filtration rate)    G-I > 90 ml/min  G-II 60-89 ml/min  G-IIIA 45-59 ml/min  G-IIIB 30-44 ml/min  G-IV 15-29 ml/min  G-V < 15 mlmin    ( may need dialysis)    ACR (urine albumin/creatinine ratio)    A-1       ACR<3  A-2 ACR 3-30  A-3 ACR >30    Our goal is to keep your eGFR going as fast as possible ( ml/min is normal). If your eGFR declines to 15-24 ml/min and stays there without recovery,  we will begin to educate you about dialysis or kidney transplant. The leading cause of kidney disease in the world is diabetes mellitus. Keep your sugar  as much as possible. The second leading cause is hypertension. Keep Your blood pressure 120-140/70-80 as much as possible. If you need refills, call the office or your drug store. You may call the office any time with any questions or concerns. DUE TO THE CORONAVIRUS CONCERN, PLEASE LIMIT YOUR TIME IN PUBLIC. [address] YOUR HANDS COMPLETELY AND FREQUENTLY. Diana Gomez

## 2020-07-27 LAB
BASOPHILS ABSOLUTE: NORMAL
BASOPHILS RELATIVE PERCENT: NORMAL
BUN BLDV-MCNC: 32 MG/DL
CALCIUM SERPL-MCNC: 8.9 MG/DL
CHLORIDE BLD-SCNC: 108 MMOL/L
CO2: 26 MMOL/L
CREAT SERPL-MCNC: 1.84 MG/DL
EOSINOPHILS ABSOLUTE: NORMAL
EOSINOPHILS RELATIVE PERCENT: NORMAL
GFR CALCULATED: 33
GLUCOSE BLD-MCNC: 219 MG/DL
HCT VFR BLD CALC: 41.6 % (ref 41–53)
HEMOGLOBIN: 13.7 G/DL (ref 13.5–17.5)
LYMPHOCYTES ABSOLUTE: NORMAL
LYMPHOCYTES RELATIVE PERCENT: NORMAL
MCH RBC QN AUTO: NORMAL PG
MCHC RBC AUTO-ENTMCNC: NORMAL G/DL
MCV RBC AUTO: NORMAL FL
MONOCYTES ABSOLUTE: NORMAL
MONOCYTES RELATIVE PERCENT: NORMAL
NEUTROPHILS ABSOLUTE: NORMAL
NEUTROPHILS RELATIVE PERCENT: NORMAL
PLATELET # BLD: 196 K/ΜL
PMV BLD AUTO: NORMAL FL
POTASSIUM SERPL-SCNC: 4.6 MMOL/L
RBC # BLD: 4.74 10^6/ΜL
SODIUM BLD-SCNC: 138 MMOL/L
WBC # BLD: 5.88 10^3/ML

## 2020-08-03 ENCOUNTER — OFFICE VISIT (OUTPATIENT)
Dept: NEPHROLOGY | Age: 85
End: 2020-08-03
Payer: MEDICARE

## 2020-08-03 VITALS
BODY MASS INDEX: 21.62 KG/M2 | HEIGHT: 69 IN | HEART RATE: 77 BPM | OXYGEN SATURATION: 94 % | RESPIRATION RATE: 18 BRPM | DIASTOLIC BLOOD PRESSURE: 56 MMHG | WEIGHT: 146 LBS | TEMPERATURE: 97.4 F | SYSTOLIC BLOOD PRESSURE: 100 MMHG

## 2020-08-03 PROCEDURE — G8420 CALC BMI NORM PARAMETERS: HCPCS | Performed by: INTERNAL MEDICINE

## 2020-08-03 PROCEDURE — 4040F PNEUMOC VAC/ADMIN/RCVD: CPT | Performed by: INTERNAL MEDICINE

## 2020-08-03 PROCEDURE — 99213 OFFICE O/P EST LOW 20 MIN: CPT | Performed by: INTERNAL MEDICINE

## 2020-08-03 PROCEDURE — G8427 DOCREV CUR MEDS BY ELIG CLIN: HCPCS | Performed by: INTERNAL MEDICINE

## 2020-08-03 PROCEDURE — 1123F ACP DISCUSS/DSCN MKR DOCD: CPT | Performed by: INTERNAL MEDICINE

## 2020-08-03 PROCEDURE — 1036F TOBACCO NON-USER: CPT | Performed by: INTERNAL MEDICINE

## 2020-08-03 NOTE — PATIENT INSTRUCTIONS
KNOW YOUR KIDNEY NUMBERS    Your kidney speed (eGFR) was 33 ml/min this visit (normal is  ml/min)(Ml/min=milliliters of blood filtered per minute). The higher this number is, the better your kidney function is. Your serum creatinine was 1.84 (normal 0.8-1.2 mg/dl at most labs). The higher this number is, the worse your kidney function is. You are in stage G-IIIB-A1 of chronic kidney disease. Your kidney function has declined as compared to your last visit. Your last eGFR was  40 Ml/Min. Stages of kidney disease    EGFR (estimated glomerular filtration rate)    G-I > 90 ml/min Kidney damage with normal kidney function (blood or protein in the urine)  G-II 60-89 ml/min Normal kidney function with mild damage with or without blood or protein in the urine  G-IIIA 45-59 ml/min Mild to moderate loss of kidney function. G-IIIB 30-44 ml/min Moderate to severe loss of kidney function  G-IV 15-29 ml/min Severe loss of kidney function  G-V < 15 mlmin     May need dialysis or kidney transplant    ACR (urine albumin/creatinine ratio) (Mg/Gm)    A-1      ACR<30 Normal to mildly increased protein in the urine. A-2 ACR  Moderate increase in urine protein loss. A-3 ACR >300 Severe increase in urine protein loss    Our goal is to keep your eGFR going as fast as possible ( ml/min is normal). If your eGFR declines to 15-24 ml/min and stays there without recovery,  we will begin to educate you about dialysis or kidney transplant. We also want to keep the protein in your urine as low as possible. The leading cause of kidney disease in the world is diabetes mellitus. Keep your sugar  as much as possible. The second leading cause is hypertension. Keep Your blood pressure 120-140/70-80 as much as possible. If you need refills, call the office or your drug store. You may call the office any time with any questions or concerns.     DUE TO THE CORONAVIRUS CONCERN, PLEASE LIMIT YOUR TIME IN PUBLIC. [address] YOUR HANDS COMPLETELY AND FREQUENTLY. Alicja Guzmánots

## 2020-08-03 NOTE — PROGRESS NOTES
[facility]    [address] [facility] [address]  [address], [address]  [phone]       Progress Note    8/3/2020 1:11 PM    Pt Name:    Andres Cadet  YOB: 1935  Primary Care Physician:  Cinthia Queen MD       Chief Complaint:   Chief Complaint   Patient presents with    Chronic Kidney Disease    Anemia    Benign Prostatic Hypertrophy    Diabetes    Hypertension    Nephropathy    Proteinuria    Nephrolithiasis        History of Chief Complaint: CKD stage G-IIIB-A1 from DM and HTN and prostatic enlargement and kidney stones. Subjective:  I last saw the patient in clinic 06/03/20. I follow the patient for Chronic Kidney disease stage G-IIIB-A1. Since our last visit the patient has not been hospitalized. The patient is sleeping well at night with 1-2 times per night nocturia. The patient has a good appetite and is remaining active. The patient denied N/V/C/D/SOB/CP. He has no trouble at bladder emptying. COVID-19 screening  Fever: none  Cough: occasional (medication induced)  Exposure: none  Shortness of breath: none    Protein/creatinine ratio:   EGFR=33 ml/min    Last six eGFR readings:  Lab Results   Component Value Date    LABGLOM 33 07/27/2020    LABGLOM 40 05/20/2020    LABGLOM 40 05/06/2020    LABGLOM 39 01/09/2020    LABGLOM 38 07/02/2019    LABGLOM 40 03/16/2019          Objective:  VITALS:  BP (!) 100/56 (Site: Left Upper Arm, Position: Sitting, Cuff Size: Small Adult)   Pulse 77   Temp 97.4 °F (36.3 °C)   Resp 18   Ht 5' 9\" (1.753 m)   Wt 146 lb (66.2 kg)   SpO2 94%   BMI 21.56 kg/m²   Weight:   Wt Readings from Last 3 Encounters:   08/03/20 146 lb (66.2 kg)   06/03/20 160 lb (72.6 kg)   05/20/20 160 lb (72.6 kg)     Body mass index is 21.56 kg/m². Physical examination    General:  Alert and cooperative with exam  HEENT:  Normocephalic. No lesions nor rashes. CARL. EOMI  Neck:   No JVD and no bruits.  Thyroid gland is normal  Lungs:  Breathing easily. No rales nor rhonchi. No cough nor sputum production. Heart[de-identified]            RRR. No murmurs nor rubs. PMI is not enlarged nor displaced. Abdomen:  Soft and non tender. Bowel sounds are active in all four quadrants. Extremities:  No edema  Neurologic:  CN II-XII are intact. No deficits noted. Muscle strength and tone are equal throughout. Skin:                Warm and dry with no rashes. Muscles:         Hand  and leg strength are equal and strong bilaterally.      Lab Data      CBC:   Lab Results   Component Value Date    WBC 5.88 07/27/2020    HGB 13.7 07/27/2020    HCT 41.6 07/27/2020    MCV 87.2 05/06/2020     07/27/2020     BMP:    Lab Results   Component Value Date     07/27/2020     05/20/2020     05/06/2020    K 4.6 07/27/2020    K 5.0 05/20/2020    K 4.6 05/06/2020     07/27/2020     05/20/2020     05/06/2020    CO2 26 07/27/2020    CO2 29 05/20/2020    CO2 25 05/06/2020    BUN 32 07/27/2020    BUN 32 05/20/2020    BUN 42 05/06/2020    CREATININE 1.84 07/27/2020    CREATININE 1.57 05/20/2020    CREATININE 1.59 05/06/2020    GLUCOSE 219 07/27/2020    GLUCOSE 197 05/20/2020    GLUCOSE 168 05/06/2020      Hepatic:   Lab Results   Component Value Date    AST 18 10/06/2018    AST 16 11/20/2014    ALT 30 10/06/2018    ALT 25 11/20/2014    BILITOT 0.2 10/06/2018    BILITOT 0.3 11/20/2014    ALKPHOS 88 10/06/2018    ALKPHOS 117 11/20/2014     BNP: No results found for: BNP  Lipids:   Lab Results   Component Value Date    CHOL 137 01/09/2020    HDL 56 01/09/2020     INR: No results found for: INR  URINE: No results found for: NAUR, PROTUR  No results found for: NITRU, COLORU, PHUR, LABCAST, WBCUA, RBCUA, MUCUS, TRICHOMONAS, YEAST, BACTERIA, CLARITYU, SPECGRAV, LEUKOCYTESUR, UROBILINOGEN, BILIRUBINUR, BLOODU, GLUCOSEU, KETUA, AMORPHOUS   Microalbumen/Creatinine ratio:  No components found for: RUCREAT        Medications:    Current Outpatient Medications

## 2020-08-18 RX ORDER — POTASSIUM CITRATE 10 MEQ/1
10 TABLET, EXTENDED RELEASE ORAL 2 TIMES DAILY
Qty: 180 TABLET | Refills: 3 | Status: SHIPPED | OUTPATIENT
Start: 2020-08-18 | End: 2021-08-10

## 2020-09-05 LAB
BUN BLDV-MCNC: 21 MG/DL
CALCIUM SERPL-MCNC: 8.7 MG/DL
CHLORIDE BLD-SCNC: 104 MMOL/L
CO2: 26 MMOL/L
CREAT SERPL-MCNC: 1.6 MG/DL
GFR CALCULATED: 39
GLUCOSE BLD-MCNC: 154 MG/DL
POTASSIUM SERPL-SCNC: 4.4 MMOL/L
SODIUM BLD-SCNC: 136 MMOL/L

## 2020-09-16 ENCOUNTER — OFFICE VISIT (OUTPATIENT)
Dept: NEPHROLOGY | Age: 85
End: 2020-09-16
Payer: MEDICARE

## 2020-09-16 VITALS
HEIGHT: 69 IN | SYSTOLIC BLOOD PRESSURE: 128 MMHG | RESPIRATION RATE: 18 BRPM | OXYGEN SATURATION: 93 % | DIASTOLIC BLOOD PRESSURE: 86 MMHG | WEIGHT: 154 LBS | BODY MASS INDEX: 22.81 KG/M2 | HEART RATE: 64 BPM | TEMPERATURE: 96.4 F

## 2020-09-16 PROCEDURE — G8428 CUR MEDS NOT DOCUMENT: HCPCS | Performed by: INTERNAL MEDICINE

## 2020-09-16 PROCEDURE — G8420 CALC BMI NORM PARAMETERS: HCPCS | Performed by: INTERNAL MEDICINE

## 2020-09-16 PROCEDURE — 1036F TOBACCO NON-USER: CPT | Performed by: INTERNAL MEDICINE

## 2020-09-16 PROCEDURE — 1123F ACP DISCUSS/DSCN MKR DOCD: CPT | Performed by: INTERNAL MEDICINE

## 2020-09-16 PROCEDURE — 4040F PNEUMOC VAC/ADMIN/RCVD: CPT | Performed by: INTERNAL MEDICINE

## 2020-09-16 PROCEDURE — 99213 OFFICE O/P EST LOW 20 MIN: CPT | Performed by: INTERNAL MEDICINE

## 2020-09-16 NOTE — PATIENT INSTRUCTIONS
KNOW YOUR KIDNEY NUMBERS    Your kidney speed (eGFR) was 39 ml/min this visit (normal is  ml/min)(Ml/min=milliliters of blood filtered per minute). The higher this number is, the better your kidney function is. Your serum creatinine was 1.60 (normal 0.8-1.2 mg/dl at most labs). The higher this number is, the worse your kidney function is. You are in stage G-IIIB-A1 of chronic kidney disease. Your kidney function has improevd as compared to your last visit. Your last eGFR was  30 Ml/Min. Stages of kidney disease    EGFR (estimated glomerular filtration rate)    G-I > 90 ml/min Kidney damage with normal kidney function (blood or protein in the urine)  G-II 60-89 ml/min Normal kidney function with mild damage with or without blood or protein in the urine  G-IIIA 45-59 ml/min Mild to moderate loss of kidney function. G-IIIB 30-44 ml/min Moderate to severe loss of kidney function  G-IV 15-29 ml/min Severe loss of kidney function  G-V < 15 mlmin     May need dialysis or kidney transplant    ACR (urine albumin/creatinine ratio) (Mg/Gm)    A-1      ACR<30 Normal to mildly increased protein in the urine. A-2 ACR  Moderate increase in urine protein loss. A-3 ACR >300 Severe increase in urine protein loss    Our goal is to keep your eGFR going as fast as possible ( ml/min is normal). If your eGFR declines to 15-24 ml/min and stays there without recovery,  we will begin to educate you about dialysis or kidney transplant. We also want to keep the protein in your urine as low as possible. The leading cause of kidney disease in the world is diabetes mellitus. Keep your sugar  as much as possible. The second leading cause is hypertension. Keep Your blood pressure 120-140/70-80 as much as possible. If you need refills, call the office or your drug store. You may call the office any time with any questions or concerns.     DUE TO THE CORONAVIRUS CONCERN, PLEASE LIMIT YOUR TIME IN PUBLIC. [address] YOUR HANDS COMPLETELY AND FREQUENTLY. Cindy Mario

## 2020-09-16 NOTE — PROGRESS NOTES
[facility]    [address] [facility] [address]  [address], [address]  [phone]       Progress Note    9/16/2020 11:48 AM    Pt Name:    Ryan Branham  YOB: 1935  Primary Care Physician:  Yeyo Castañeda MD       Chief Complaint:   Chief Complaint   Patient presents with    Chronic Kidney Disease    Diabetes    Hypertension    Nephropathy    Proteinuria        History of Chief Complaint: CKD stage G-IIIB-A1 from DM and HTN and prostatic enlargement and kidney stones. Subjective:  I last saw the patient in clinic 08/03/2020. I follow the patient for Chronic Kidney disease stage G-IIIB-A1. Since our last visit the patient has not been hospitalized. The patient is sleeping well at night with 1-2 times per night nocturia. The patient has a good appetite and is remaining active. The patient denied N/V/C/D/SOB/CP. He has no trouble at bladder emptying. He has noticed loose stools since starting losartan. His chest X-ray for cough was normal. His cough cleared up after taking mucinex and an inhaler. COVID-19 screening  Fever: none  Cough: none  Exposure: none  Shortness of breath: none    Protein/creatinine ratio:   eGFR: 39 Ml/min      Last six eGFR readings:  Lab Results   Component Value Date    LABGLOM 39 09/05/2020    LABGLOM 33 07/27/2020    LABGLOM 40 05/20/2020    LABGLOM 40 05/06/2020    LABGLOM 39 01/09/2020    LABGLOM 38 07/02/2019          Objective:  VITALS:  /86 (Site: Left Upper Arm, Position: Sitting, Cuff Size: Small Adult)   Pulse 64   Temp 96.4 °F (35.8 °C)   Resp 18   Ht 5' 9\" (1.753 m)   Wt 154 lb (69.9 kg)   SpO2 93%   BMI 22.74 kg/m²   Weight:   Wt Readings from Last 3 Encounters:   09/16/20 154 lb (69.9 kg)   08/03/20 146 lb (66.2 kg)   06/03/20 160 lb (72.6 kg)     Body mass index is 22.74 kg/m². Physical examination    General:  Alert and cooperative with exam  HEENT:  Normocephalic. No lesions nor rashes. CARL.  EOMI  Neck:   No JVD and no bruits. Thyroid gland is normal  Lungs:  Breathing easily. No rales nor rhonchi. No cough nor sputum production. Heart[de-identified]            RRR. No murmurs nor rubs. PMI is not enlarged nor displaced. Abdomen:  Soft and non tender. Bowel sounds are active in all four quadrants. Extremities:   No edema   Neurologic:  CN II-XII are intact. No deficits noted. Muscle strength and tone are equal throughout. Skin:                Warm and dry with no rashes. Muscles:         Hand  and leg strength are equal and strong bilaterally.      Lab Data      CBC:   Lab Results   Component Value Date    WBC 5.88 07/27/2020    HGB 13.7 07/27/2020    HCT 41.6 07/27/2020    MCV 87.2 05/06/2020     07/27/2020     BMP:    Lab Results   Component Value Date     09/05/2020     07/27/2020     05/20/2020    K 4.4 09/05/2020    K 4.6 07/27/2020    K 5.0 05/20/2020     09/05/2020     07/27/2020     05/20/2020    CO2 26 09/05/2020    CO2 26 07/27/2020    CO2 29 05/20/2020    BUN 21 09/05/2020    BUN 32 07/27/2020    BUN 32 05/20/2020    CREATININE 1.60 09/05/2020    CREATININE 1.84 07/27/2020    CREATININE 1.57 05/20/2020    GLUCOSE 154 09/05/2020    GLUCOSE 219 07/27/2020    GLUCOSE 197 05/20/2020      Hepatic:   Lab Results   Component Value Date    AST 18 10/06/2018    AST 16 11/20/2014    ALT 30 10/06/2018    ALT 25 11/20/2014    BILITOT 0.2 10/06/2018    BILITOT 0.3 11/20/2014    ALKPHOS 88 10/06/2018    ALKPHOS 117 11/20/2014     BNP: No results found for: BNP  Lipids:   Lab Results   Component Value Date    CHOL 137 01/09/2020    HDL 56 01/09/2020     INR: No results found for: INR  URINE: No results found for: NAUR, PROTUR  No results found for: NITRU, COLORU, PHUR, LABCAST, WBCUA, RBCUA, MUCUS, TRICHOMONAS, YEAST, BACTERIA, CLARITYU, SPECGRAV, LEUKOCYTESUR, UROBILINOGEN, BILIRUBINUR, BLOODU, GLUCOSEU, KETUA, AMORPHOUS   Microalbumen/Creatinine ratio:  No components found for: RUCREAT        Medications:    Current Outpatient Medications   Medication Sig Dispense Refill    potassium citrate (UROCIT-K) 10 MEQ (1080 MG) extended release tablet Take 1 tablet by mouth 2 times daily 180 tablet 3    metFORMIN (GLUCOPHAGE) 850 MG tablet Take 1 tablet by mouth 2 times daily (before meals) 60 tablet 12    glimepiride (AMARYL) 4 MG tablet Take 4 mg by mouth every morning (before breakfast)       NONFORMULARY Domperidone 10 mg qd      pravastatin (PRAVACHOL) 40 MG tablet Take 40 mg by mouth daily      CPAP Machine MISC by Does not apply route      Docusate Sodium (COLACE PO) Take by mouth      isosorbide mononitrate (IMDUR) 120 MG extended release tablet 120 mg daily   4    atenolol (TENORMIN) 25 MG tablet Take 12.5 mg by mouth 2 times daily       magnesium oxide (MAG-OX) 400 MG tablet Take 400 mg by mouth daily      nitroGLYCERIN (NITROSTAT) 0.4 MG SL tablet Place 0.4 mg under the tongue every 5 minutes as needed for Chest pain      Cholecalciferol (CVS VIT D 5000 HIGH-POTENCY PO) Take by mouth daily      zinc 50 MG CAPS Take 50 mg by mouth daily      Multiple Vitamins-Minerals (MULTIVITAMIN PO) Take by mouth daily      pantoprazole (PROTONIX) 40 MG tablet Take 40 mg by mouth 2 times daily       aspirin 81 MG tablet Take 81 mg by mouth daily.  ferrous sulfate 325 (65 FE) MG tablet Take 325 mg by mouth daily (with breakfast)       losartan (COZAAR) 25 MG tablet Take 0.5 tablets by mouth daily 90 tablet 1     No current facility-administered medications for this visit. IMPRESSIONS:      Kidney disease: CKD stage G-IIIB-A1  Loose stools from losartan  Anemia: Anemia remains stable. No need for an erythrocyte stimulating agent (NELLI). Bone and mineral metabolism: He has no bone pain. PLAN:  1. We discussed the eGFR today. 2. We will continue all current medications without changes. 3. He takes losartan for proteinuria. Will reduce losartan to MWF  4.  Checking

## 2021-01-15 LAB
BASOPHILS ABSOLUTE: ABNORMAL
BASOPHILS RELATIVE PERCENT: ABNORMAL
EOSINOPHILS ABSOLUTE: ABNORMAL
EOSINOPHILS RELATIVE PERCENT: ABNORMAL
HCT VFR BLD CALC: 40.1 % (ref 41–53)
HEMOGLOBIN: 13.2 G/DL (ref 13.5–17.5)
LYMPHOCYTES ABSOLUTE: ABNORMAL
LYMPHOCYTES RELATIVE PERCENT: ABNORMAL
MCH RBC QN AUTO: ABNORMAL PG
MCHC RBC AUTO-ENTMCNC: ABNORMAL G/DL
MCV RBC AUTO: ABNORMAL FL
MONOCYTES ABSOLUTE: ABNORMAL
MONOCYTES RELATIVE PERCENT: ABNORMAL
NEUTROPHILS ABSOLUTE: ABNORMAL
NEUTROPHILS RELATIVE PERCENT: ABNORMAL
PLATELET # BLD: 257 K/ΜL
PMV BLD AUTO: ABNORMAL FL
RBC # BLD: 4.67 10^6/ΜL
WBC # BLD: 7.37 10^3/ML

## 2021-01-18 PROBLEM — N18.30 CKD (CHRONIC KIDNEY DISEASE) STAGE 3, GFR 30-59 ML/MIN (HCC): Status: ACTIVE | Noted: 2019-01-24

## 2021-01-18 PROBLEM — D64.9 ANEMIA: Status: ACTIVE | Noted: 2019-08-01

## 2021-01-18 PROBLEM — E11.22 TYPE 2 DIABETES MELLITUS WITH STAGE 3 CHRONIC KIDNEY DISEASE, WITHOUT LONG-TERM CURRENT USE OF INSULIN (HCC): Status: ACTIVE | Noted: 2019-01-24

## 2021-01-18 PROBLEM — K31.84 GASTROPARESIS: Status: ACTIVE | Noted: 2019-01-24

## 2021-01-18 PROBLEM — I10 ESSENTIAL HYPERTENSION: Status: ACTIVE | Noted: 2019-10-01

## 2021-01-18 PROBLEM — M48.02 CERVICAL STENOSIS OF SPINE: Status: ACTIVE | Noted: 2019-09-17

## 2021-01-18 PROBLEM — N40.0 BPH (BENIGN PROSTATIC HYPERPLASIA): Status: ACTIVE | Noted: 2019-01-24

## 2021-01-18 PROBLEM — N18.30 TYPE 2 DIABETES MELLITUS WITH STAGE 3 CHRONIC KIDNEY DISEASE, WITHOUT LONG-TERM CURRENT USE OF INSULIN (HCC): Status: ACTIVE | Noted: 2019-01-24

## 2021-01-18 LAB
BUN BLDV-MCNC: 26 MG/DL
CALCIUM SERPL-MCNC: 9 MG/DL
CHLORIDE BLD-SCNC: 106 MMOL/L
CO2: 26 MMOL/L
CREAT SERPL-MCNC: 1.47 MG/DL
GFR CALCULATED: 43
GLUCOSE BLD-MCNC: 148 MG/DL
POTASSIUM SERPL-SCNC: 4.7 MMOL/L
SODIUM BLD-SCNC: 139 MMOL/L

## 2021-01-20 ENCOUNTER — OFFICE VISIT (OUTPATIENT)
Dept: NEPHROLOGY | Age: 86
End: 2021-01-20
Payer: MEDICARE

## 2021-01-20 VITALS
WEIGHT: 151 LBS | SYSTOLIC BLOOD PRESSURE: 158 MMHG | HEIGHT: 69 IN | DIASTOLIC BLOOD PRESSURE: 78 MMHG | OXYGEN SATURATION: 96 % | HEART RATE: 67 BPM | BODY MASS INDEX: 22.36 KG/M2 | RESPIRATION RATE: 18 BRPM

## 2021-01-20 DIAGNOSIS — N18.32 STAGE 3B CHRONIC KIDNEY DISEASE (HCC): Primary | ICD-10-CM

## 2021-01-20 PROCEDURE — G8427 DOCREV CUR MEDS BY ELIG CLIN: HCPCS | Performed by: INTERNAL MEDICINE

## 2021-01-20 PROCEDURE — 99214 OFFICE O/P EST MOD 30 MIN: CPT | Performed by: INTERNAL MEDICINE

## 2021-01-20 PROCEDURE — 1036F TOBACCO NON-USER: CPT | Performed by: INTERNAL MEDICINE

## 2021-01-20 PROCEDURE — 1123F ACP DISCUSS/DSCN MKR DOCD: CPT | Performed by: INTERNAL MEDICINE

## 2021-01-20 PROCEDURE — G8484 FLU IMMUNIZE NO ADMIN: HCPCS | Performed by: INTERNAL MEDICINE

## 2021-01-20 PROCEDURE — G8420 CALC BMI NORM PARAMETERS: HCPCS | Performed by: INTERNAL MEDICINE

## 2021-01-20 PROCEDURE — 4040F PNEUMOC VAC/ADMIN/RCVD: CPT | Performed by: INTERNAL MEDICINE

## 2021-01-20 NOTE — PATIENT INSTRUCTIONS

## 2021-01-20 NOTE — PROGRESS NOTES
[facility]    [address] [facility] [address]  [address], [address]  [phone]       Progress Note    1/20/2021 10:53 AM    Pt Name:    Theresa Yi  YOB: 1935  Primary Care Physician:  Mo Parikh MD       Chief Complaint:   Chief Complaint   Patient presents with    Chronic Kidney Disease    Diabetes    Hypertension    Nephropathy    Proteinuria        History of Chief Complaint: CKD stage G-IIIB-A1 from DM and HTN and prostatic enlargement and kidney stones. Subjective:  I last saw the patient in clinic 09/16/2020. I follow the patient for Chronic Kidney disease stage G-IIIB-A1. Since our last visit the patient has not been hospitalized. The patient is sleeping well at night with 1-2 times per night nocturia. The patient has a good appetite and is remaining active. The patient denied N/V/C/D/SOB/CP. He has no trouble at bladder emptying. His blood pressure at home runs 120-140/70-80 and his glucose runs . He takes the losartan 1/2 tablet on MWF for proteinuria. He really does not need blood pressure control. He fell down recently and needed help to get back up. He complains of feeling tired all the time. COVID-19 screening  Fever: none  Cough: none  Exposure: none  Shortness of breath: none    Protein/creatinine ratio:   eGFR: 43 Ml/min (it was 39 Ml/Min last visit)      Last six eGFR readings:  Lab Results   Component Value Date    LABGLOM 43 01/18/2021    LABGLOM 39 09/05/2020    LABGLOM 33 07/27/2020    LABGLOM 40 05/20/2020    LABGLOM 40 05/06/2020    LABGLOM 39 01/09/2020          Objective:  VITALS:  BP (!) 158/78 (Site: Left Upper Arm, Position: Sitting, Cuff Size: Small Adult)   Pulse 67   Resp 18   Ht 5' 9\" (1.753 m)   Wt 151 lb (68.5 kg)   SpO2 96%   BMI 22.30 kg/m²   Weight:   Wt Readings from Last 3 Encounters:   01/20/21 151 lb (68.5 kg)   09/16/20 154 lb (69.9 kg)   08/03/20 146 lb (66.2 kg)     Body mass index is 22.3 kg/m². Physical examination    General:  Alert and cooperative with exam  HEENT:  Normocephalic. No lesions nor rashes. CARL. EOMI  Neck:   No JVD and no bruits. Thyroid gland is normal  Lungs:  Breathing easily. No rales nor rhonchi. No cough nor sputum production. Heart[de-identified]            RRR. No murmurs nor rubs. PMI is not enlarged nor displaced. Abdomen:  Soft and non tender. Bowel sounds are active in all four quadrants. Extremities:  no edema  Neurologic:  CN II-XII are intact. No deficits noted. Muscle strength and tone are equal throughout. Skin:                Warm and dry with no rashes. Muscles:         Hand  and leg strength are equal and strong bilaterally.      Lab Data      CBC:   Lab Results   Component Value Date    WBC 7.37 01/15/2021    HGB 13.2 (A) 01/15/2021    HCT 40.1 (A) 01/15/2021    MCV 87.2 05/06/2020     01/15/2021     BMP:    Lab Results   Component Value Date     01/18/2021     09/05/2020     07/27/2020    K 4.7 01/18/2021    K 4.4 09/05/2020    K 4.6 07/27/2020     01/18/2021     09/05/2020     07/27/2020    CO2 26 01/18/2021    CO2 26 09/05/2020    CO2 26 07/27/2020    BUN 26 01/18/2021    BUN 21 09/05/2020    BUN 32 07/27/2020    CREATININE 1.47 01/18/2021    CREATININE 1.60 09/05/2020    CREATININE 1.84 07/27/2020    GLUCOSE 148 01/18/2021    GLUCOSE 154 09/05/2020    GLUCOSE 219 07/27/2020      Hepatic:   Lab Results   Component Value Date    AST 18 10/06/2018    AST 16 11/20/2014    ALT 30 10/06/2018    ALT 25 11/20/2014    BILITOT 0.2 10/06/2018    BILITOT 0.3 11/20/2014    ALKPHOS 88 10/06/2018    ALKPHOS 117 11/20/2014     BNP: No results found for: BNP  Lipids:   Lab Results   Component Value Date    CHOL 137 01/09/2020    HDL 56 01/09/2020     INR: No results found for: INR  URINE: No results found for: NAUR, PROTUR  No results found for: NITRU, COLORU, PHUR, LABCAST, WBCUA, RBCUA, MUCUS, TRICHOMONAS, YEAST, BACTERIA, CLARITYU, will continue all current medications without changes. 3. We will see the patient back in 4 months.       _________________________________  Kane Mario.  Salinas Pillai DO  [facility]      Dallas Guerra MD

## 2021-05-05 LAB
ABSOLUTE BASO #: 100 /CMM (ref 0–200)
ABSOLUTE EOS #: 300 /CMM (ref 0–500)
ABSOLUTE LYMPH #: 1700 /CMM (ref 1000–4800)
ABSOLUTE MONO #: 1000 /CMM (ref 0–800)
ABSOLUTE NEUT #: 3700 /CMM (ref 1800–7700)
ANION GAP SERPL CALCULATED.3IONS-SCNC: 7 MMOL/L (ref 4–12)
BASOPHILS RELATIVE PERCENT: 1.1 % (ref 0–2)
BUN BLDV-MCNC: 35 MG/DL (ref 7–20)
CALCIUM SERPL-MCNC: 8.8 MG/DL (ref 8.8–10.5)
CHLORIDE BLD-SCNC: 102 MEQ/L (ref 101–111)
CO2: 26 MEQ/L (ref 21–32)
CREAT SERPL-MCNC: 1.58 MG/DL (ref 0.6–1.3)
CREATININE CLEARANCE: 42
EOSINOPHILS RELATIVE PERCENT: 3.8 % (ref 0–6)
GLUCOSE: 185 MG/DL (ref 70–110)
HCT VFR BLD CALC: 39.1 % (ref 40–49)
HEMOGLOBIN: 12.9 GM/DL (ref 13.5–16.5)
LYMPHOCYTES RELATIVE PERCENT: 25.4 % (ref 15–45)
MCH RBC QN AUTO: 28.8 PG (ref 27.5–33)
MCHC RBC AUTO-ENTMCNC: 32.9 GM/DL (ref 33–36)
MCV RBC AUTO: 87.4 CU MIC (ref 80–97)
MONOCYTES RELATIVE PERCENT: 14.4 % (ref 2–10)
NEUTROPHILS RELATIVE PERCENT: 55.3 % (ref 40–70)
NUCLEATED RBCS: 0.1 /100 WBC
PDW BLD-RTO: 15.3 % (ref 12–16)
PLATELET # BLD: 251 TH/CMM (ref 150–400)
POTASSIUM SERPL-SCNC: 5.2 MEQ/L (ref 3.6–5)
RBC # BLD: 4.47 MIL/CMM (ref 4.5–6)
SODIUM BLD-SCNC: 135 MEQ/L (ref 135–145)
WBC # BLD: 6.7 TH/CMM (ref 4.4–10.5)

## 2021-05-07 RX ORDER — LOSARTAN POTASSIUM 25 MG/1
TABLET ORAL
Qty: 45 TABLET | Refills: 3 | Status: SHIPPED | OUTPATIENT
Start: 2021-05-07 | End: 2021-12-27

## 2021-05-19 ENCOUNTER — OFFICE VISIT (OUTPATIENT)
Dept: NEPHROLOGY | Age: 86
End: 2021-05-19
Payer: MEDICARE

## 2021-05-19 VITALS
WEIGHT: 150 LBS | HEART RATE: 65 BPM | RESPIRATION RATE: 18 BRPM | HEIGHT: 69 IN | SYSTOLIC BLOOD PRESSURE: 120 MMHG | DIASTOLIC BLOOD PRESSURE: 66 MMHG | BODY MASS INDEX: 22.22 KG/M2 | OXYGEN SATURATION: 95 %

## 2021-05-19 DIAGNOSIS — N18.32 STAGE 3B CHRONIC KIDNEY DISEASE (HCC): Primary | ICD-10-CM

## 2021-05-19 PROCEDURE — 4040F PNEUMOC VAC/ADMIN/RCVD: CPT | Performed by: INTERNAL MEDICINE

## 2021-05-19 PROCEDURE — G8427 DOCREV CUR MEDS BY ELIG CLIN: HCPCS | Performed by: INTERNAL MEDICINE

## 2021-05-19 PROCEDURE — 99214 OFFICE O/P EST MOD 30 MIN: CPT | Performed by: INTERNAL MEDICINE

## 2021-05-19 PROCEDURE — 1123F ACP DISCUSS/DSCN MKR DOCD: CPT | Performed by: INTERNAL MEDICINE

## 2021-05-19 PROCEDURE — G8420 CALC BMI NORM PARAMETERS: HCPCS | Performed by: INTERNAL MEDICINE

## 2021-05-19 PROCEDURE — 1036F TOBACCO NON-USER: CPT | Performed by: INTERNAL MEDICINE

## 2021-05-19 NOTE — PATIENT INSTRUCTIONS
KNOW YOUR KIDNEY NUMBERS    Your kidney speed (eGFR) was 42 ml/min this visit (normal is  ml/min)(Ml/min=milliliters of blood filtered per minute). The higher this number is, the better your kidney function is. Your serum creatinine was 1.58 (normal 0.8-1.2 mg/dl at most labs). The higher this number is, the worse your kidney function is. You are in stage G-IIIB-A1 of chronic kidney disease. Your kidney function has remained stable as compared to your last visit. Your last eGFR was  43 Ml/Min. Stages of kidney disease    EGFR (estimated glomerular filtration rate)    G-I > 90 ml/min Kidney damage with normal kidney function (blood or protein in the urine)  G-II 60-89 ml/min Normal kidney function with mild damage with or without blood or protein in the urine  G-IIIA 45-59 ml/min Mild to moderate loss of kidney function. G-IIIB 30-44 ml/min Moderate to severe loss of kidney function  G-IV 15-29 ml/min Severe loss of kidney function  G-V < 15 mlmin     May need dialysis or kidney transplant    ACR (urine albumin/creatinine ratio) (Mg/Gm)    A-1      ACR<30 Normal to mildly increased protein in the urine. A-2 ACR  Moderate increase in urine protein loss. A-3 ACR >300 Severe increase in urine protein loss    Our goal is to keep your eGFR going as fast as possible ( ml/min is normal). If your eGFR declines to 15-24 ml/min and stays there without recovery,  we will begin to educate you about dialysis or kidney transplant. We also want to keep the protein in your urine as low as possible. The leading cause of kidney disease in the world is diabetes mellitus. Keep your sugar  as much as possible. The second leading cause is hypertension. Keep Your blood pressure 120-140/70-80 as much as possible. If you need refills, call the office or your drug store. You may call the office any time with any questions or concerns.     DUE TO THE CORONAVIRUS CONCERN, PLEASE LIMIT YOUR TIME

## 2021-05-19 NOTE — PROGRESS NOTES
[facility]    [address] [facility] [address]  [address], [address]  [phone]       Progress Note    5/19/2021 10:57 AM    Pt Name:    Sheila Ordonez  YOB: 1935  Primary Care Physician:  Malick Aguilar MD       Chief Complaint:   Chief Complaint   Patient presents with    Chronic Kidney Disease    Diabetes    Hypertension    Nephropathy    Proteinuria        History of Chief Complaint: CKD stage G-IIIB-A1 from DM and HTN and prostatic enlargement and kidney stones. Subjective:  I last saw the patient in clinic 01/20/2021. I follow the patient for Chronic Kidney disease stage G-IIIB-A1. Since our last visit the patient has not been hospitalized. The patient is sleeping well at night with 1-2 times per night nocturia. The patient has a good appetite and is remaining active. The patient denied N/V/C/D/SOB/CP. He has no trouble at bladder emptying. He has been falling lately. We reviewed his CT brain. There were no abnormalities. COVID-19 screening  Fever: none  Cough: none  Exposure: none  Shortness of breath: none    Protein/creatinine ratio:   eGFR: 42 Ml/min (it was 43 Ml/Min last visit)  SCr: 1.58 Mg/Dl (It was 1.57 Mg/Dl last visit)      Last six eGFR readings:  Lab Results   Component Value Date    LABGLOM 43 01/18/2021    LABGLOM 39 09/05/2020    LABGLOM 33 07/27/2020    LABGLOM 40 05/20/2020    LABGLOM 40 05/06/2020    LABGLOM 39 01/09/2020          Objective:  VITALS:  /66 (Site: Left Upper Arm, Position: Sitting, Cuff Size: Small Adult)   Pulse 65   Resp 18   Ht 5' 9\" (1.753 m)   Wt 150 lb (68 kg)   SpO2 95%   BMI 22.15 kg/m²   Weight:   Wt Readings from Last 3 Encounters:   05/19/21 150 lb (68 kg)   01/20/21 151 lb (68.5 kg)   09/16/20 154 lb (69.9 kg)     Body mass index is 22.15 kg/m². Physical examination    General:  Alert and cooperative with exam  HEENT:  Normocephalic. No lesions nor rashes. CARL. EOMI  Neck:   No JVD and no bruits. Thyroid gland is normal  Lungs:  Breathing easily. No rales nor rhonchi. No cough nor sputum production. Heart[de-identified]            RRR. No murmurs nor rubs. PMI is not enlarged nor displaced. Abdomen:  Soft and non tender. Bowel sounds are active in all four quadrants. Extremities:  no edema  Neurologic:  CN II-XII are intact. No deficits noted. Muscle strength and tone are equal throughout. Skin:                Warm and dry with no rashes. Muscles:         Hand  and leg strength are equal and strong bilaterally.      Lab Data      CBC:   Lab Results   Component Value Date    WBC 6.7 05/05/2021    HGB 12.9 (L) 05/05/2021    HCT 39.1 (L) 05/05/2021    MCV 87.4 05/05/2021     05/05/2021     BMP:    Lab Results   Component Value Date     05/05/2021     01/18/2021     09/05/2020    K 5.2 (H) 05/05/2021    K 4.7 01/18/2021    K 4.4 09/05/2020     05/05/2021     01/18/2021     09/05/2020    CO2 26 05/05/2021    CO2 26 01/18/2021    CO2 26 09/05/2020    BUN 35 (H) 05/05/2021    BUN 26 01/18/2021    BUN 21 09/05/2020    CREATININE 1.58 (H) 05/05/2021    CREATININE 1.47 01/18/2021    CREATININE 1.60 09/05/2020    GLUCOSE 185 (H) 05/05/2021    GLUCOSE 148 01/18/2021    GLUCOSE 154 09/05/2020      Hepatic:   Lab Results   Component Value Date    AST 18 10/06/2018    AST 16 11/20/2014    ALT 30 10/06/2018    ALT 25 11/20/2014    BILITOT 0.2 10/06/2018    BILITOT 0.3 11/20/2014    ALKPHOS 88 10/06/2018    ALKPHOS 117 11/20/2014     BNP: No results found for: BNP  Lipids:   Lab Results   Component Value Date    CHOL 137 01/09/2020    HDL 56 01/09/2020     INR: No results found for: INR  URINE: No results found for: NAUR, PROTUR  No results found for: NITRU, COLORU, PHUR, LABCAST, WBCUA, RBCUA, MUCUS, TRICHOMONAS, YEAST, BACTERIA, CLARITYU, SPECGRAV, LEUKOCYTESUR, UROBILINOGEN, BILIRUBINUR, BLOODU, GLUCOSEU, KETUA, AMORPHOUS   Microalbumen/Creatinine ratio:  No components found for: RUCREAT        Medications:    Current Outpatient Medications   Medication Sig Dispense Refill    losartan (COZAAR) 25 MG tablet TAKE 1/2 TABLET BY MOUTH EVERY DAY 45 tablet 3    potassium citrate (UROCIT-K) 10 MEQ (1080 MG) extended release tablet Take 1 tablet by mouth 2 times daily 180 tablet 3    metFORMIN (GLUCOPHAGE) 850 MG tablet Take 1 tablet by mouth 2 times daily (before meals) 60 tablet 12    glimepiride (AMARYL) 4 MG tablet Take 4 mg by mouth every morning (before breakfast)       NONFORMULARY Domperidone 10 mg qd      pravastatin (PRAVACHOL) 40 MG tablet Take 40 mg by mouth daily      CPAP Machine MISC by Does not apply route      Docusate Sodium (COLACE PO) Take by mouth      isosorbide mononitrate (IMDUR) 120 MG extended release tablet 120 mg daily   4    atenolol (TENORMIN) 25 MG tablet Take 12.5 mg by mouth 2 times daily       magnesium oxide (MAG-OX) 400 MG tablet Take 400 mg by mouth daily      nitroGLYCERIN (NITROSTAT) 0.4 MG SL tablet Place 0.4 mg under the tongue every 5 minutes as needed for Chest pain      Cholecalciferol (CVS VIT D 5000 HIGH-POTENCY PO) Take by mouth daily      zinc 50 MG CAPS Take 50 mg by mouth daily      Multiple Vitamins-Minerals (MULTIVITAMIN PO) Take by mouth daily      pantoprazole (PROTONIX) 40 MG tablet Take 40 mg by mouth 2 times daily       aspirin 81 MG tablet Take 81 mg by mouth daily.  ferrous sulfate 325 (65 FE) MG tablet Take 325 mg by mouth daily (with breakfast)        No current facility-administered medications for this visit. IMPRESSIONS:        Kidney disease: CKD stage G-IIIB-A1  Anemia: Anemia remains stable. No need for an erythrocyte stimulating agent (NELLI). Bone and mineral metabolism: There is no complaint of bone pain. PLAN:  1. We discussed the eGFR today. 2. We will continue all current medications without changes. 3. We will see the patient back in 4 months. _________________________________  Jamil Crawford.  Silvia Gross DO  [facility]      Evelin Bueno MD

## 2021-08-10 RX ORDER — POTASSIUM CITRATE 10 MEQ/1
TABLET, EXTENDED RELEASE ORAL
Qty: 180 TABLET | Refills: 3 | Status: SHIPPED | OUTPATIENT
Start: 2021-08-10 | End: 2022-01-10 | Stop reason: SDUPTHER

## 2021-09-07 LAB
ABSOLUTE BASO #: 0 /CMM (ref 0–200)
ABSOLUTE EOS #: 400 /CMM (ref 0–500)
ABSOLUTE LYMPH #: 1400 /CMM (ref 1000–4800)
ABSOLUTE MONO #: 700 /CMM (ref 0–800)
ABSOLUTE NEUT #: 5500 /CMM (ref 1800–7700)
ANION GAP SERPL CALCULATED.3IONS-SCNC: 11 MMOL/L (ref 4–12)
BASOPHILS RELATIVE PERCENT: 0.3 % (ref 0–2)
BUN BLDV-MCNC: 30 MG/DL (ref 7–20)
CALCIUM SERPL-MCNC: 9.5 MG/DL (ref 8.8–10.5)
CHLORIDE BLD-SCNC: 102 MEQ/L (ref 101–111)
CO2: 24 MEQ/L (ref 21–32)
CREAT SERPL-MCNC: 1.69 MG/DL (ref 0.6–1.3)
CREATININE CLEARANCE: 39
EOSINOPHILS RELATIVE PERCENT: 4.9 % (ref 0–6)
GLUCOSE: 140 MG/DL (ref 70–110)
HCT VFR BLD CALC: 38.2 % (ref 40–49)
HEMOGLOBIN: 12.8 GM/DL (ref 13.5–16.5)
LYMPHOCYTES RELATIVE PERCENT: 17 % (ref 15–45)
MCH RBC QN AUTO: 28.8 PG (ref 27.5–33)
MCHC RBC AUTO-ENTMCNC: 33.6 GM/DL (ref 33–36)
MCV RBC AUTO: 85.9 CU MIC (ref 80–97)
MONOCYTES RELATIVE PERCENT: 8.8 % (ref 2–10)
NEUTROPHILS RELATIVE PERCENT: 69 % (ref 40–70)
NUCLEATED RBCS: 0.1 /100 WBC
OVALOCYTES: SLIGHT
PDW BLD-RTO: 15.3 % (ref 12–16)
PLATELET # BLD: 230 TH/CMM (ref 150–400)
POTASSIUM SERPL-SCNC: 4.8 MEQ/L (ref 3.6–5)
RBC # BLD: 4.45 MIL/CMM (ref 4.5–6)
SODIUM BLD-SCNC: 137 MEQ/L (ref 135–145)
WBC # BLD: 8 TH/CMM (ref 4.4–10.5)

## 2021-09-15 ENCOUNTER — OFFICE VISIT (OUTPATIENT)
Dept: NEPHROLOGY | Age: 86
End: 2021-09-15
Payer: MEDICARE

## 2021-09-15 VITALS
OXYGEN SATURATION: 96 % | BODY MASS INDEX: 22.22 KG/M2 | RESPIRATION RATE: 18 BRPM | HEIGHT: 69 IN | HEART RATE: 70 BPM | WEIGHT: 150 LBS | DIASTOLIC BLOOD PRESSURE: 74 MMHG | SYSTOLIC BLOOD PRESSURE: 118 MMHG

## 2021-09-15 DIAGNOSIS — N18.32 STAGE 3B CHRONIC KIDNEY DISEASE (HCC): Primary | ICD-10-CM

## 2021-09-15 PROCEDURE — G8420 CALC BMI NORM PARAMETERS: HCPCS | Performed by: INTERNAL MEDICINE

## 2021-09-15 PROCEDURE — 1123F ACP DISCUSS/DSCN MKR DOCD: CPT | Performed by: INTERNAL MEDICINE

## 2021-09-15 PROCEDURE — 1036F TOBACCO NON-USER: CPT | Performed by: INTERNAL MEDICINE

## 2021-09-15 PROCEDURE — 4040F PNEUMOC VAC/ADMIN/RCVD: CPT | Performed by: INTERNAL MEDICINE

## 2021-09-15 PROCEDURE — 99214 OFFICE O/P EST MOD 30 MIN: CPT | Performed by: INTERNAL MEDICINE

## 2021-09-15 PROCEDURE — G8427 DOCREV CUR MEDS BY ELIG CLIN: HCPCS | Performed by: INTERNAL MEDICINE

## 2021-09-15 NOTE — PROGRESS NOTES
bruits. Thyroid gland is normal  Lungs:  Breathing easily. No rales nor rhonchi. No cough nor sputum production. Heart[de-identified]            RRR. No murmurs nor rubs. PMI is not enlarged nor displaced. Abdomen:  Soft and non tender. Bowel sounds are active in all four quadrants. Extremities:  no edema  Neurologic:  CN II-XII are intact. No deficits noted. Muscle strength and tone are equal throughout. Skin:                Warm and dry with no rashes. Muscles:         Hand  and leg strength are equal and strong bilaterally.      Lab Data      CBC:   Lab Results   Component Value Date    WBC 8.0 09/07/2021    HGB 12.8 (L) 09/07/2021    HCT 38.2 (L) 09/07/2021    MCV 85.9 09/07/2021     09/07/2021     BMP:    Lab Results   Component Value Date     09/07/2021     05/05/2021     01/18/2021    K 4.8 09/07/2021    K 5.2 (H) 05/05/2021    K 4.7 01/18/2021     09/07/2021     05/05/2021     01/18/2021    CO2 24 09/07/2021    CO2 26 05/05/2021    CO2 26 01/18/2021    BUN 30 (H) 09/07/2021    BUN 35 (H) 05/05/2021    BUN 26 01/18/2021    CREATININE 1.69 (H) 09/07/2021    CREATININE 1.58 (H) 05/05/2021    CREATININE 1.47 01/18/2021    GLUCOSE 140 (H) 09/07/2021    GLUCOSE 185 (H) 05/05/2021    GLUCOSE 148 01/18/2021      Hepatic:   Lab Results   Component Value Date    AST 18 10/06/2018    AST 16 11/20/2014    ALT 30 10/06/2018    ALT 25 11/20/2014    BILITOT 0.2 10/06/2018    BILITOT 0.3 11/20/2014    ALKPHOS 88 10/06/2018    ALKPHOS 117 11/20/2014     BNP: No results found for: BNP  Lipids:   Lab Results   Component Value Date    CHOL 137 01/09/2020    HDL 56 01/09/2020     INR: No results found for: INR  URINE: No results found for: NAUR, PROTUR  No results found for: Makeda Ferns, PHUR, LABCAST, WBCUA, RBCUA, MUCUS, TRICHOMONAS, YEAST, BACTERIA, CLARITYU, SPECGRAV, LEUKOCYTESUR, UROBILINOGEN, BILIRUBINUR, BLOODU, GLUCOSEU, KETUA, AMORPHOUS   Microalbumen/Creatinine ratio:  No components found for: RUCREAT        Medications:    Current Outpatient Medications   Medication Sig Dispense Refill    potassium citrate (UROCIT-K) 10 MEQ (1080 MG) extended release tablet TAKE 1 TABLET BY MOUTH TWICE A  tablet 3    losartan (COZAAR) 25 MG tablet TAKE 1/2 TABLET BY MOUTH EVERY DAY 45 tablet 3    metFORMIN (GLUCOPHAGE) 850 MG tablet Take 1 tablet by mouth 2 times daily (before meals) 60 tablet 12    glimepiride (AMARYL) 4 MG tablet Take 4 mg by mouth every morning (before breakfast)       NONFORMULARY Domperidone 10 mg qd      pravastatin (PRAVACHOL) 40 MG tablet Take 40 mg by mouth daily      CPAP Machine MISC by Does not apply route      Docusate Sodium (COLACE PO) Take by mouth      isosorbide mononitrate (IMDUR) 120 MG extended release tablet 120 mg daily   4    atenolol (TENORMIN) 25 MG tablet Take 12.5 mg by mouth 2 times daily       magnesium oxide (MAG-OX) 400 MG tablet Take 400 mg by mouth daily      nitroGLYCERIN (NITROSTAT) 0.4 MG SL tablet Place 0.4 mg under the tongue every 5 minutes as needed for Chest pain      Cholecalciferol (CVS VIT D 5000 HIGH-POTENCY PO) Take by mouth daily      zinc 50 MG CAPS Take 50 mg by mouth daily      Multiple Vitamins-Minerals (MULTIVITAMIN PO) Take by mouth daily      pantoprazole (PROTONIX) 40 MG tablet Take 40 mg by mouth 2 times daily       aspirin 81 MG tablet Take 81 mg by mouth daily.  ferrous sulfate 325 (65 FE) MG tablet Take 325 mg by mouth daily (with breakfast)        No current facility-administered medications for this visit. IMPRESSIONS:        Kidney disease: CKD stage [id number]  Anemia: Anemia remains stable. No need for an erythrocyte stimulating agent (NELLI). Bone and mineral metabolism: There is no complaint of bone pain. PLAN:  1. We discussed the eGFR today. 2. We will continue all current medications without changes. 3. Checking urine microalbumin/creatinine ratio.   4. We will

## 2021-09-15 NOTE — PATIENT INSTRUCTIONS
KNOW YOUR KIDNEY NUMBERS    Your kidney speed (eGFR) was 39 ml/min this visit (normal is  ml/min)(Ml/min=milliliters of blood filtered per minute). The higher this number is, the better your kidney function is. Your serum creatinine was 1.69 (normal 0.8-1.2 mg/dl at most labs). The higher this number is, the worse your kidney function is. You are in stage G-IIIB-A1 of chronic kidney disease. Your kidney function has declined as compared to your last visit. Your last eGFR was  42 Ml/Min. Stages of kidney disease    EGFR (estimated glomerular filtration rate)    G-I > 90 ml/min Kidney damage with normal kidney function (blood or protein in the urine)  G-II 60-89 ml/min Normal kidney function with mild damage with or without blood or protein in the urine  G-IIIA 45-59 ml/min Mild to moderate loss of kidney function. G-IIIB 30-44 ml/min Moderate to severe loss of kidney function  G-IV 15-29 ml/min Severe loss of kidney function  G-V < 15 mlmin     May need dialysis or kidney transplant    ACR (urine albumin/creatinine ratio) (Mg/Gm)    A-1      ACR<30 Normal to mildly increased protein in the urine. A-2 ACR  Moderate increase in urine protein loss. A-3 ACR >300 Severe increase in urine protein loss    Our goal is to keep your eGFR going as fast as possible ( ml/min is normal). If your eGFR declines to 15-24 ml/min and stays there without recovery,  we will begin to educate you about dialysis or kidney transplant. We also want to keep the protein in your urine as low as possible. The leading cause of kidney disease in the world is diabetes mellitus. Keep your sugar  as much as possible. The second leading cause is hypertension. Keep Your blood pressure 120-140/70-80 as much as possible. If you need refills, call the office or your drug store. You may call the office any time with any questions or concerns.     DUE TO THE CORONAVIRUS CONCERN, PLEASE LIMIT YOUR TIME IN PUBLIC. [address] YOUR HANDS COMPLETELY AND FREQUENTLY. Tatyana Bobby

## 2021-10-18 ENCOUNTER — TELEPHONE (OUTPATIENT)
Dept: NEPHROLOGY | Age: 86
End: 2021-10-18

## 2021-10-18 NOTE — TELEPHONE ENCOUNTER
Stopping the glimipiride is the right thing to do as recent data is showing that these kind of medications will make the pancrease worse. It is OK to start Jardiance. Avery Adler will increase his urine output.  If he is taking a diuretic, it may be wise to stop it so that we do not overdiurese the patient  Be Bishop

## 2021-10-18 NOTE — TELEPHONE ENCOUNTER
Pt called and states Dr. Rk Laguerre wants to stop the glimepiride and put him on Jardiance. He wants to make sure this is okay w/you?

## 2021-12-27 RX ORDER — LOSARTAN POTASSIUM 25 MG/1
TABLET ORAL
Qty: 45 TABLET | Refills: 3 | Status: SHIPPED | OUTPATIENT
Start: 2021-12-27

## 2022-01-10 ENCOUNTER — TELEPHONE (OUTPATIENT)
Dept: NEPHROLOGY | Age: 87
End: 2022-01-10

## 2022-01-10 DIAGNOSIS — N18.32 TYPE 2 DIABETES MELLITUS WITH STAGE 3B CHRONIC KIDNEY DISEASE, WITHOUT LONG-TERM CURRENT USE OF INSULIN (HCC): Primary | ICD-10-CM

## 2022-01-10 DIAGNOSIS — E11.22 TYPE 2 DIABETES MELLITUS WITH STAGE 3B CHRONIC KIDNEY DISEASE, WITHOUT LONG-TERM CURRENT USE OF INSULIN (HCC): Primary | ICD-10-CM

## 2022-01-10 RX ORDER — POTASSIUM CITRATE 10 MEQ/1
TABLET, EXTENDED RELEASE ORAL
Qty: 60 TABLET | Refills: 0 | Status: SHIPPED | OUTPATIENT
Start: 2022-01-10 | End: 2022-09-07

## 2022-01-13 NOTE — TELEPHONE ENCOUNTER
I spoke to Severino about this. He will go to [facility] to get the lab done. I faxed the order.

## 2022-01-19 NOTE — TELEPHONE ENCOUNTER
I looked to see if potassium result was back. Patient is currently in [facility]. Potassium level today is 4.3 yesterday it was 5.1.

## 2022-02-14 ENCOUNTER — OFFICE VISIT (OUTPATIENT)
Dept: NEPHROLOGY | Age: 87
End: 2022-02-14
Payer: MEDICARE

## 2022-02-14 VITALS — DIASTOLIC BLOOD PRESSURE: 50 MMHG | HEART RATE: 74 BPM | SYSTOLIC BLOOD PRESSURE: 108 MMHG | OXYGEN SATURATION: 98 %

## 2022-02-14 DIAGNOSIS — I10 HTN (HYPERTENSION), BENIGN: ICD-10-CM

## 2022-02-14 DIAGNOSIS — N18.32 STAGE 3B CHRONIC KIDNEY DISEASE (HCC): Primary | ICD-10-CM

## 2022-02-14 DIAGNOSIS — N20.0 NEPHROLITHIASIS: ICD-10-CM

## 2022-02-14 PROCEDURE — 1036F TOBACCO NON-USER: CPT | Performed by: INTERNAL MEDICINE

## 2022-02-14 PROCEDURE — 1123F ACP DISCUSS/DSCN MKR DOCD: CPT | Performed by: INTERNAL MEDICINE

## 2022-02-14 PROCEDURE — 4040F PNEUMOC VAC/ADMIN/RCVD: CPT | Performed by: INTERNAL MEDICINE

## 2022-02-14 PROCEDURE — G8420 CALC BMI NORM PARAMETERS: HCPCS | Performed by: INTERNAL MEDICINE

## 2022-02-14 PROCEDURE — 99213 OFFICE O/P EST LOW 20 MIN: CPT | Performed by: INTERNAL MEDICINE

## 2022-02-14 PROCEDURE — G8484 FLU IMMUNIZE NO ADMIN: HCPCS | Performed by: INTERNAL MEDICINE

## 2022-02-14 PROCEDURE — G8427 DOCREV CUR MEDS BY ELIG CLIN: HCPCS | Performed by: INTERNAL MEDICINE

## 2022-02-14 RX ORDER — EMPAGLIFLOZIN 10 MG/1
TABLET, FILM COATED ORAL
COMMUNITY
Start: 2022-01-10 | End: 2022-09-07

## 2022-02-14 NOTE — PROGRESS NOTES
[facility]        Outpatient Follow-Up note         2/14/2022 1:59 PM    Patient Name:   Cris Galicia  YOB: 1935  Primary Care Physician:  Chantelle Ovalles MD   [address] [address]  Dept: [phone]  Loc: [phone]     Chief Complaint / Reason for follow-up : Follow Up of CKD     Interval History :  Patient seen and examined by me. Feels ok. No cp or SOB. Had covid 5 weeks ago   patient.       Past History :  Past Medical History:   Diagnosis Date    Acute renal insufficiency     Bladder outlet obstruction     CAD (coronary artery disease)     CKD (chronic kidney disease), stage II     DM (diabetes mellitus) ([facility] [address] 2001    Gastric ulcer 1980    H/O ventral hernia repair 06/29/06    Hematuria     Hypoglycemia     S/P appendectomy     S/P rotator cuff repair     Smoking hx      Past Surgical History:   Procedure Laterality Date    CORONARY ARTERY BYPASS GRAFT          Medications :     Outpatient Medications Marked as Taking for the 2/14/22 encounter (Office Visit) with Brad Barroso MD   Medication Sig Dispense Refill    JARDIANCE 10 MG tablet       potassium citrate (UROCIT-K) 10 MEQ (1080 MG) extended release tablet TAKE 1 TABLET BY MOUTH TWICE A DAY 60 tablet 0    losartan (COZAAR) 25 MG tablet TAKE ONE-HALF TABLET BY  MOUTH DAILY 45 tablet 3    metFORMIN (GLUCOPHAGE) 850 MG tablet Take 1 tablet by mouth 2 times daily (before meals) 60 tablet 12    glimepiride (AMARYL) 4 MG tablet Take 4 mg by mouth every morning (before breakfast)       NONFORMULARY Domperidone 10 mg qd      pravastatin (PRAVACHOL) 40 MG tablet Take 40 mg by mouth daily      CPAP Machine MISC by Does not apply route      Docusate Sodium (COLACE PO) Take by mouth      isosorbide mononitrate (IMDUR) 120 MG extended release tablet 120 mg daily   4    atenolol (TENORMIN) 25 MG tablet Take 12.5 mg by mouth 2 times daily       magnesium oxide (MAG-OX) 400 MG tablet Take 400 mg by mouth daily      nitroGLYCERIN (NITROSTAT) 0.4 MG SL tablet Place 0.4 mg under the tongue every 5 minutes as needed for Chest pain      Cholecalciferol (CVS VIT D 5000 HIGH-POTENCY PO) Take by mouth daily      zinc 50 MG CAPS Take 50 mg by mouth daily      Multiple Vitamins-Minerals (MULTIVITAMIN PO) Take by mouth daily      pantoprazole (PROTONIX) 40 MG tablet Take 40 mg by mouth 2 times daily       aspirin 81 MG tablet Take 81 mg by mouth daily.  ferrous sulfate 325 (65 FE) MG tablet Take 325 mg by mouth daily (with breakfast)          Vitals     BP (!) 108/50 (Site: Right Upper Arm, Position: Sitting, Cuff Size: Medium Adult)   Pulse 74   SpO2 98%  Wt Readings from Last 3 Encounters:   09/15/21 150 lb (68 kg)   05/19/21 150 lb (68 kg)   01/20/21 151 lb (68.5 kg)        Physical Exam     General -- no distress  Lungs -- clear  Heart -- S1, S2 heard, JVD - no  Abdomen - soft, non-tender  Extremities -- no edema  CNS - awake and alert    Labs, Radiology and Tests    Labs -    1/22           Potassium 4.7           BUN 46           Creatinine 1.33           eGFR 48                       UPCR            UMCR                          Renal Ultrasound Scan -- not done        Assessment    1. Renal -patient has CKD stage III most likely secondary to senile nephrosclerosis longstanding hypertension and diabetes  -Her GFR runs around 48 mL/minute which is at the baseline  -CT scan from 2019 shows nephrolithiasis no specific mass noted we will obtain a renal ultrasound scan at some point  2. Electrolytes-appear to be within normal limits  3. Essential hypertension running well continue current medications  4. Hx of diabetes mellitus currently on Metformin okay to continue as long as the renal function is less than 30 mL/minute  5.  Hx of nephrolithiasis currently on Urocit-K, based on CT scan  6. meds reviewed and D/W patient  7. FU in 6 months    Tests and orders placed this Encounter     Orders Placed This Encounter   Procedures    Basic Metabolic Panel    Urinalysis with Microscopic    Creatinine, Random Urine    MICROALBUMIN, RANDOM URINE (W/O CREATININE)    Protein, urine, random       Brad Barroso M.D  [facility].

## 2022-09-07 ENCOUNTER — OFFICE VISIT (OUTPATIENT)
Dept: NEPHROLOGY | Age: 87
End: 2022-09-07
Payer: MEDICARE

## 2022-09-07 VITALS
DIASTOLIC BLOOD PRESSURE: 64 MMHG | WEIGHT: 145 LBS | HEART RATE: 81 BPM | OXYGEN SATURATION: 98 % | SYSTOLIC BLOOD PRESSURE: 138 MMHG | BODY MASS INDEX: 21.41 KG/M2

## 2022-09-07 DIAGNOSIS — E11.21 DIABETIC NEPHROPATHY WITH PROTEINURIA (HCC): ICD-10-CM

## 2022-09-07 DIAGNOSIS — I10 HTN (HYPERTENSION), BENIGN: ICD-10-CM

## 2022-09-07 DIAGNOSIS — N20.0 NEPHROLITHIASIS: ICD-10-CM

## 2022-09-07 DIAGNOSIS — N18.32 STAGE 3B CHRONIC KIDNEY DISEASE (HCC): Primary | ICD-10-CM

## 2022-09-07 PROCEDURE — 1123F ACP DISCUSS/DSCN MKR DOCD: CPT | Performed by: INTERNAL MEDICINE

## 2022-09-07 PROCEDURE — G8427 DOCREV CUR MEDS BY ELIG CLIN: HCPCS | Performed by: INTERNAL MEDICINE

## 2022-09-07 PROCEDURE — 99213 OFFICE O/P EST LOW 20 MIN: CPT | Performed by: INTERNAL MEDICINE

## 2022-09-07 PROCEDURE — G8420 CALC BMI NORM PARAMETERS: HCPCS | Performed by: INTERNAL MEDICINE

## 2022-09-07 PROCEDURE — 1036F TOBACCO NON-USER: CPT | Performed by: INTERNAL MEDICINE

## 2022-09-07 NOTE — PROGRESS NOTES
[facility]        Outpatient Follow-Up note         9/7/2022 11:23 AM    Patient Name:   Teresa Odonnell  YOB: 1935  Primary Care Physician:  Mindi Loaiza MD   [address] [place] [address]  Dept: [phone]  Loc: [phone]     Chief Complaint / Reason for follow-up : Follow Up of CKD     Interval History :  Patient seen and examined by me. Feels ok. No cp or SOB.   No hospitalizations and no med changes      Past History :  Past Medical History:   Diagnosis Date    Acute renal insufficiency     Bladder outlet obstruction     CAD (coronary artery disease)     CKD (chronic kidney disease), stage II     [facility]    Gastric ulcer 1980    H/O ventral hernia repair 06/29/06    Hematuria     Hypoglycemia     S/P appendectomy     S/P rotator cuff repair     Smoking hx      Past Surgical History:   Procedure Laterality Date    CORONARY ARTERY BYPASS GRAFT          Medications :     Outpatient Medications Marked as Taking for the 9/7/22 encounter (Office Visit) with Dennis Denton MD   Medication Sig Dispense Refill    losartan (COZAAR) 25 MG tablet TAKE ONE-HALF TABLET BY  MOUTH DAILY (Patient taking differently: Take 25 mg by mouth three times a week Only taking mon,wed, friday) 45 tablet 3    metFORMIN (GLUCOPHAGE) 850 MG tablet Take 1 tablet by mouth 2 times daily (before meals) 60 tablet 12    glimepiride (AMARYL) 4 MG tablet Take 4 mg by mouth every morning (before breakfast)       NONFORMULARY Domperidone 10 mg qd      pravastatin (PRAVACHOL) 40 MG tablet Take 40 mg by mouth daily      Docusate Sodium (COLACE PO) Take by mouth      isosorbide mononitrate (IMDUR) 120 MG extended release tablet 120 mg daily   4    atenolol (TENORMIN) 25 MG tablet Take 25 mg by mouth daily      nitroGLYCERIN (NITROSTAT) 0.4 MG SL tablet Place 0.4 mg under the tongue every 5 minutes as needed for Chest pain      Cholecalciferol (CVS VIT D 5000 HIGH-POTENCY PO) Take by mouth daily      zinc 50 MG CAPS Take 50 mg by mouth daily      Multiple Vitamins-Minerals (MULTIVITAMIN PO) Take by mouth daily      pantoprazole (PROTONIX) 40 MG tablet Take 40 mg by mouth 2 times daily       aspirin 81 MG tablet Take 81 mg by mouth daily. ferrous sulfate 325 (65 FE) MG tablet Take 325 mg by mouth daily (with breakfast)          Vitals     /64 (Site: Left Upper Arm, Position: Sitting, Cuff Size: Small Adult)   Pulse 81   Wt 145 lb (65.8 kg)   SpO2 98%   BMI 21.41 kg/m²  Wt Readings from Last 3 Encounters:   09/07/22 145 lb (65.8 kg)   09/15/21 150 lb (68 kg)   05/19/21 150 lb (68 kg)        Physical Exam     General -- no distress  Lungs -- clear  Heart -- S1, S2 heard, JVD - no  Abdomen - soft, non-tender  Extremities -- no edema  CNS - awake and alert    Labs, Radiology and Tests    Labs -    1/22 8/22          Potassium 4.7 4.7          BUN 46 53          Creatinine 1.33 1.58          eGFR 48 43                      UPCR  900          UMCR  521                        Renal Ultrasound Scan -- not done        Assessment    Renal -patient has CKD stage III most likely secondary to senile nephrosclerosis longstanding hypertension and diabetes  -Her GFR runs around 48 mL/minute which is at the baseline, now slightly worse with mild fluctuations  -CT scan from 2019 shows nephrolithiasis no specific mass noted we will obtain a renal ultrasound scan at some point  Electrolytes-appear to be within normal limits  Essential hypertension running well continue current medications  Hx of diabetes mellitus currently on Metformin okay to continue as long as the renal function is less than 30 mL/minute  Proteinuria mostly due to diabetic nephropathy currently on losartan  Hx of nephrolithiasis currently he is off of Urocit-K, based on CT scan.  Will start if needed  meds reviewed and D/W patient  FU in 6 months    Tests and orders placed this Encounter     Orders Placed This Encounter   Procedures    Basic Metabolic Panel    Comprehensive Metabolic Panel    Urinalysis with Microscopic    Creatinine, Random Urine    MICROALBUMIN, RANDOM URINE (W/O CREATININE)    Protein, urine, random         Chris Mahmood M.D  [facility].

## 2022-11-21 RX ORDER — LOSARTAN POTASSIUM 25 MG/1
TABLET ORAL
Qty: 45 TABLET | Refills: 3 | Status: SHIPPED | OUTPATIENT
Start: 2022-11-21

## 2023-01-31 NOTE — PROGRESS NOTES
Presents for obstetric check at 15w2d gestation.  Patient feels well, no problems, baby is active.  Denies pain or contractions.      1. Encounter for supervision of other normal pregnancy in second trimester  NIPS NEG  Declines AFP  Reviewed diet and weight gain in pregnancy      Return to clinic 4 weeks for anatomy US.   breakfast)      glimepiride (AMARYL) 1 MG tablet Take 1 mg by mouth every morning (before breakfast)      amLODIPine (NORVASC) 5 MG tablet Take 5 mg by mouth daily      pravastatin (PRAVACHOL) 40 MG tablet Take 40 mg by mouth daily      potassium citrate (UROCIT-K) 10 MEQ (1080 MG) extended release tablet Take 1 tablet by mouth 2 times daily 60 tablet 5    CPAP Machine MISC by Does not apply route      Docusate Sodium (COLACE PO) Take by mouth      isosorbide mononitrate (IMDUR) 120 MG extended release tablet 120 mg daily   4    atenolol (TENORMIN) 25 MG tablet Take 25 mg by mouth daily       magnesium oxide (MAG-OX) 400 MG tablet Take 400 mg by mouth daily      nitroGLYCERIN (NITROSTAT) 0.4 MG SL tablet Place 0.4 mg under the tongue every 5 minutes as needed for Chest pain      Cholecalciferol (CVS VIT D 5000 HIGH-POTENCY PO) Take by mouth daily      zinc 50 MG CAPS Take 50 mg by mouth daily      Multiple Vitamins-Minerals (MULTIVITAMIN PO) Take by mouth daily      pantoprazole (PROTONIX) 40 MG tablet Take 40 mg by mouth daily       aspirin 81 MG tablet Take 81 mg by mouth daily.  ferrous sulfate 325 (65 FE) MG tablet Take 325 mg by mouth daily (with breakfast)       NONFORMULARY Domperidone 10 mg qd       No current facility-administered medications for this visit. IMPRESSIONS:        Kidney disease: CKD stage G-IIIB-A1  Anemia: Anemia remains stable. No need for an erythrocyte stimulating agent (NELLI). Bone and mineral metabolism: stable       PLAN:  1. We discussed the eGFR today. 2. We will continue all current medications without changes. 3. Will increase metformin to 850 mg twice daily. It does not hurt the kidney function. 4. Adding low dose ARB to protect against diabetic nephropathy. 5. We will see the patient back in 2 months.               _________________________________  Tara Seth.  Maria Teresa Fernandez DO  [facility]      Олег Nguyen MD

## 2023-03-13 ENCOUNTER — OFFICE VISIT (OUTPATIENT)
Dept: NEPHROLOGY | Age: 88
End: 2023-03-13
Payer: MEDICARE

## 2023-03-13 VITALS
DIASTOLIC BLOOD PRESSURE: 54 MMHG | HEART RATE: 64 BPM | OXYGEN SATURATION: 96 % | SYSTOLIC BLOOD PRESSURE: 132 MMHG | BODY MASS INDEX: 20.82 KG/M2 | WEIGHT: 141 LBS

## 2023-03-13 DIAGNOSIS — N20.0 NEPHROLITHIASIS: ICD-10-CM

## 2023-03-13 DIAGNOSIS — E11.21 DIABETIC NEPHROPATHY WITH PROTEINURIA (HCC): ICD-10-CM

## 2023-03-13 DIAGNOSIS — I10 HTN (HYPERTENSION), BENIGN: ICD-10-CM

## 2023-03-13 DIAGNOSIS — N18.32 STAGE 3B CHRONIC KIDNEY DISEASE (HCC): Primary | ICD-10-CM

## 2023-03-13 PROCEDURE — G8420 CALC BMI NORM PARAMETERS: HCPCS | Performed by: INTERNAL MEDICINE

## 2023-03-13 PROCEDURE — G8427 DOCREV CUR MEDS BY ELIG CLIN: HCPCS | Performed by: INTERNAL MEDICINE

## 2023-03-13 PROCEDURE — 1036F TOBACCO NON-USER: CPT | Performed by: INTERNAL MEDICINE

## 2023-03-13 PROCEDURE — 1123F ACP DISCUSS/DSCN MKR DOCD: CPT | Performed by: INTERNAL MEDICINE

## 2023-03-13 PROCEDURE — 99213 OFFICE O/P EST LOW 20 MIN: CPT | Performed by: INTERNAL MEDICINE

## 2023-03-13 PROCEDURE — G8484 FLU IMMUNIZE NO ADMIN: HCPCS | Performed by: INTERNAL MEDICINE

## 2023-03-13 NOTE — PROGRESS NOTES
[facility]        Outpatient Follow-Up note         3/13/2023 2:10 PM    Patient Name:   Patrice Keating  YOB: 1935  Primary Care Physician:  Sallie Albarran MD   [address] [address]  Dept: [phone]  Loc: [phone]     Chief Complaint / Reason for follow-up : Follow Up of CKD     Interval History :  Patient seen and examined by me. Feels ok. No cp or SOB. No hospitalizations and no med changes      Past History :  Past Medical History:   Diagnosis Date    Acute renal insufficiency     Bladder outlet obstruction     CAD (coronary artery disease)     CKD (chronic kidney disease), stage II     [facility]    Gastric ulcer 1980    H/O ventral hernia repair 06/29/06    Hematuria     Hypoglycemia     S/P appendectomy     S/P rotator cuff repair     Smoking hx      Past Surgical History:   Procedure Laterality Date    CORONARY ARTERY BYPASS GRAFT          Medications :     Outpatient Medications Marked as Taking for the 3/13/23 encounter (Office Visit) with Lary Restrepo MD   Medication Sig Dispense Refill    losartan (COZAAR) 25 MG tablet TAKE ONE-HALF TABLET BY  MOUTH DAILY (Patient taking differently:  Only taking on Mon,wed and fri) 45 tablet 3    metFORMIN (GLUCOPHAGE) 850 MG tablet Take 1 tablet by mouth 2 times daily (before meals) 60 tablet 12    glimepiride (AMARYL) 4 MG tablet Take 4 mg by mouth every morning (before breakfast)       NONFORMULARY Domperidone 10 mg qd      pravastatin (PRAVACHOL) 20 MG tablet Take 40 mg by mouth daily      Docusate Sodium (COLACE PO) Take by mouth      isosorbide mononitrate (IMDUR) 120 MG extended release tablet 120 mg daily   4    atenolol (TENORMIN) 25 MG tablet Take 25 mg by mouth daily      Cholecalciferol (CVS VIT D 5000 HIGH-POTENCY PO) Take by mouth daily      zinc 50 MG CAPS Take 50 mg by mouth daily      Multiple Vitamins-Minerals (MULTIVITAMIN PO) Take by mouth daily      pantoprazole (PROTONIX) 40 MG tablet Take 40 mg by mouth 2 times daily       aspirin 81 MG tablet Take 81 mg by mouth daily. ferrous sulfate 325 (65 FE) MG tablet Take 325 mg by mouth daily (with breakfast)          Vitals     BP (!) 132/54 (Site: Left Upper Arm, Position: Sitting, Cuff Size: Medium Adult)   Pulse 64   Wt 141 lb (64 kg)   SpO2 96%   BMI 20.82 kg/m²  Wt Readings from Last 3 Encounters:   03/13/23 141 lb (64 kg)   09/07/22 145 lb (65.8 kg)   09/15/21 150 lb (68 kg)        Physical Exam     General -- no distress  Lungs -- clear  Heart -- S1, S2 heard, JVD - no  Abdomen - soft, non-tender  Extremities -- no edema  CNS - awake and alert    Labs, Radiology and Tests    Labs -    1/22 8/22          Potassium 4.7 4.7          BUN 46 53          Creatinine 1.33 1.58          eGFR 48 43                      UPCR  900          UMCR  521                        Renal Ultrasound Scan -- not done        Assessment    Renal -patient has CKD stage III most likely secondary to senile nephrosclerosis longstanding hypertension and diabetes  -Her GFR runs around 44 mL/minute which is at the baselinee, overall stable  -CT scan from 2019 shows nephrolithiasis no specific mass noted we will obtain a renal ultrasound scan at some point  Electrolytes-appear to be within normal limits  Essential hypertension running well continue current medications  Hx of diabetes mellitus currently on Metformin okay to continue as long as the renal function is less than 30 mL/minute.   He is apparently only taking metformin 500 twice a day with change on the med list  Proteinuria mostly due to diabetic nephropathy currently on losartan  Hx of nephrolithiasis currently he is off of Urocit-K, follow for now  meds reviewed and D/W patient  FU in 6 months    Tests and orders placed this Encounter     No orders of the defined types were placed in this vicenta. Mary Lares M.D  [facility].

## 2023-09-11 ENCOUNTER — OFFICE VISIT (OUTPATIENT)
Dept: NEPHROLOGY | Age: 88
End: 2023-09-11
Payer: MEDICARE

## 2023-09-11 VITALS
HEART RATE: 67 BPM | WEIGHT: 141 LBS | OXYGEN SATURATION: 95 % | SYSTOLIC BLOOD PRESSURE: 108 MMHG | BODY MASS INDEX: 20.82 KG/M2 | DIASTOLIC BLOOD PRESSURE: 62 MMHG

## 2023-09-11 DIAGNOSIS — N18.32 STAGE 3B CHRONIC KIDNEY DISEASE (HCC): Primary | ICD-10-CM

## 2023-09-11 DIAGNOSIS — I10 HTN (HYPERTENSION), BENIGN: ICD-10-CM

## 2023-09-11 DIAGNOSIS — N20.0 NEPHROLITHIASIS: ICD-10-CM

## 2023-09-11 DIAGNOSIS — E11.21 DIABETIC NEPHROPATHY WITH PROTEINURIA (HCC): ICD-10-CM

## 2023-09-11 PROCEDURE — G8420 CALC BMI NORM PARAMETERS: HCPCS | Performed by: INTERNAL MEDICINE

## 2023-09-11 PROCEDURE — G8428 CUR MEDS NOT DOCUMENT: HCPCS | Performed by: INTERNAL MEDICINE

## 2023-09-11 PROCEDURE — 1123F ACP DISCUSS/DSCN MKR DOCD: CPT | Performed by: INTERNAL MEDICINE

## 2023-09-11 PROCEDURE — 1036F TOBACCO NON-USER: CPT | Performed by: INTERNAL MEDICINE

## 2023-09-11 PROCEDURE — 99214 OFFICE O/P EST MOD 30 MIN: CPT | Performed by: INTERNAL MEDICINE

## 2023-09-12 ENCOUNTER — TELEPHONE (OUTPATIENT)
Dept: NEPHROLOGY | Age: 88
End: 2023-09-12

## 2023-09-12 NOTE — TELEPHONE ENCOUNTER
Dr Bhavin Anthony is requesting patients Metformin be decreased to 500 mg twice a day. Called Dr Arthur Patch office to advise of this.  Will await a return call

## 2024-03-06 ENCOUNTER — OFFICE VISIT (OUTPATIENT)
Dept: NEPHROLOGY | Age: 89
End: 2024-03-06
Payer: MEDICARE

## 2024-03-06 VITALS
HEART RATE: 65 BPM | SYSTOLIC BLOOD PRESSURE: 150 MMHG | BODY MASS INDEX: 20.23 KG/M2 | WEIGHT: 137 LBS | OXYGEN SATURATION: 96 % | DIASTOLIC BLOOD PRESSURE: 73 MMHG

## 2024-03-06 DIAGNOSIS — E11.21 DIABETIC NEPHROPATHY WITH PROTEINURIA (HCC): ICD-10-CM

## 2024-03-06 DIAGNOSIS — N18.32 STAGE 3B CHRONIC KIDNEY DISEASE (HCC): Primary | ICD-10-CM

## 2024-03-06 DIAGNOSIS — I10 HTN (HYPERTENSION), BENIGN: ICD-10-CM

## 2024-03-06 DIAGNOSIS — N20.0 NEPHROLITHIASIS: ICD-10-CM

## 2024-03-06 PROCEDURE — G8484 FLU IMMUNIZE NO ADMIN: HCPCS | Performed by: INTERNAL MEDICINE

## 2024-03-06 PROCEDURE — G8420 CALC BMI NORM PARAMETERS: HCPCS | Performed by: INTERNAL MEDICINE

## 2024-03-06 PROCEDURE — G8427 DOCREV CUR MEDS BY ELIG CLIN: HCPCS | Performed by: INTERNAL MEDICINE

## 2024-03-06 PROCEDURE — 1036F TOBACCO NON-USER: CPT | Performed by: INTERNAL MEDICINE

## 2024-03-06 PROCEDURE — 1123F ACP DISCUSS/DSCN MKR DOCD: CPT | Performed by: INTERNAL MEDICINE

## 2024-03-06 PROCEDURE — 99213 OFFICE O/P EST LOW 20 MIN: CPT | Performed by: INTERNAL MEDICINE

## 2024-03-06 NOTE — PROGRESS NOTES
[facility]        Outpatient Follow-Up note         3/6/2024 2:19 PM    Patient Name:   Rafael Gardiner  YOB: 1935  Primary Care Physician:  Shari Mitchell MD   [facility] [place] [facility]  [facility]  [address] [address]  Dept: [phone]  Loc: [phone]     Chief Complaint / Reason for follow-up : Follow Up of CKD     Interval History :  Patient seen and examined by me.   Feels ok. No cp or SOB.  No hospitalizations and no med changes      Past History :  Past Medical History:   Diagnosis Date    Acute renal insufficiency     Bladder outlet obstruction     CAD (coronary artery disease)     CKD (chronic kidney disease), stage II     DM (diabetes mellitus) ([facility]) 2001    Gastric ulcer 1980    H/O ventral hernia repair 06/29/06    Hematuria     Hypoglycemia     S/P appendectomy     S/P rotator cuff repair     Smoking hx      Past Surgical History:   Procedure Laterality Date    CORONARY ARTERY BYPASS GRAFT          Medications :     Outpatient Medications Marked as Taking for the 3/6/24 encounter (Office Visit) with Moshe Álvarez MD   Medication Sig Dispense Refill    metoprolol tartrate (LOPRESSOR) 25 MG tablet Take 1 tablet by mouth 2 times daily      losartan (COZAAR) 25 MG tablet TAKE ONE-HALF TABLET BY  MOUTH DAILY (Patient taking differently: Only taking on Mon,wed and fri) 45 tablet 3    metFORMIN (GLUCOPHAGE) 850 MG tablet Take 1 tablet by mouth 2 times daily (before meals) (Patient taking differently: Take 500 mg by mouth 2 times daily (before meals)) 60 tablet 12    glimepiride (AMARYL) 4 MG tablet Take 1 tablet by mouth every morning (before breakfast)      NONFORMULARY Domperidone 10 mg qd      pravastatin (PRAVACHOL) 20 MG tablet Take 2 tablets by mouth daily      Docusate Sodium (COLACE PO) Take by mouth      isosorbide mononitrate (IMDUR) 120 MG extended release tablet 1 tablet daily  4

## 2024-10-02 ENCOUNTER — OFFICE VISIT (OUTPATIENT)
Dept: NEPHROLOGY | Age: 89
End: 2024-10-02

## 2024-10-02 VITALS — OXYGEN SATURATION: 96 % | SYSTOLIC BLOOD PRESSURE: 148 MMHG | HEART RATE: 85 BPM | DIASTOLIC BLOOD PRESSURE: 80 MMHG

## 2024-10-02 DIAGNOSIS — N18.32 STAGE 3B CHRONIC KIDNEY DISEASE (HCC): Primary | ICD-10-CM

## 2024-10-02 DIAGNOSIS — I10 HTN (HYPERTENSION), BENIGN: ICD-10-CM

## 2024-10-02 DIAGNOSIS — E11.21 DIABETIC NEPHROPATHY WITH PROTEINURIA (HCC): ICD-10-CM

## 2024-10-02 DIAGNOSIS — N20.0 NEPHROLITHIASIS: ICD-10-CM

## 2025-01-25 LAB
ALBUMIN: 3.5 G/DL
BILIRUBIN, URINE: NEGATIVE
BLOOD, URINE: POSITIVE
BUN / CREAT RATIO: 23
BUN BLDV-MCNC: 42 MG/DL
CALCIUM SERPL-MCNC: 9 MG/DL
CHLORIDE BLD-SCNC: 104 MMOL/L
CLARITY, UA: CLEAR
CO2: 26 MMOL/L
COLOR, UA: YELLOW
CREAT SERPL-MCNC: 1.8 MG/DL
CREATININE URINE: 38 MG/DL
GFR, ESTIMATED: 36
GLUCOSE URINE: NEGATIVE
GLUCOSE: 165
KETONES, URINE: NEGATIVE
LEUKOCYTE ESTERASE, URINE: NEGATIVE
MICROALBUMIN/CREAT 24H UR: 143.7 MG/DL
MICROALBUMIN/CREAT UR-RTO: 3782 MG/G
NITRITE, URINE: NEGATIVE
PH UA: 6.5 (ref 4.5–8)
PHOSPHORUS: 3.6 MG/DL
POTASSIUM SERPL-SCNC: 4.6 MMOL/L
PROTEIN UA: ABNORMAL
SODIUM BLD-SCNC: 138 MMOL/L
SPECIFIC GRAVITY UA: 1.01 (ref 1–1.03)
UROBILINOGEN, URINE: ABNORMAL

## 2025-02-05 ENCOUNTER — OFFICE VISIT (OUTPATIENT)
Dept: NEPHROLOGY | Age: 89
End: 2025-02-05
Payer: MEDICARE

## 2025-02-05 VITALS
SYSTOLIC BLOOD PRESSURE: 140 MMHG | HEART RATE: 90 BPM | WEIGHT: 142 LBS | OXYGEN SATURATION: 96 % | DIASTOLIC BLOOD PRESSURE: 70 MMHG | BODY MASS INDEX: 20.97 KG/M2

## 2025-02-05 DIAGNOSIS — N18.32 STAGE 3B CHRONIC KIDNEY DISEASE (HCC): Primary | ICD-10-CM

## 2025-02-05 DIAGNOSIS — E11.21 DIABETIC NEPHROPATHY WITH PROTEINURIA (HCC): ICD-10-CM

## 2025-02-05 DIAGNOSIS — N20.0 NEPHROLITHIASIS: ICD-10-CM

## 2025-02-05 DIAGNOSIS — I10 HTN (HYPERTENSION), BENIGN: ICD-10-CM

## 2025-02-05 PROCEDURE — 1036F TOBACCO NON-USER: CPT | Performed by: INTERNAL MEDICINE

## 2025-02-05 PROCEDURE — G8420 CALC BMI NORM PARAMETERS: HCPCS | Performed by: INTERNAL MEDICINE

## 2025-02-05 PROCEDURE — G8427 DOCREV CUR MEDS BY ELIG CLIN: HCPCS | Performed by: INTERNAL MEDICINE

## 2025-02-05 PROCEDURE — 1123F ACP DISCUSS/DSCN MKR DOCD: CPT | Performed by: INTERNAL MEDICINE

## 2025-02-05 PROCEDURE — 1159F MED LIST DOCD IN RCRD: CPT | Performed by: INTERNAL MEDICINE

## 2025-02-05 PROCEDURE — 99213 OFFICE O/P EST LOW 20 MIN: CPT | Performed by: INTERNAL MEDICINE

## 2025-02-05 PROCEDURE — G2211 COMPLEX E/M VISIT ADD ON: HCPCS | Performed by: INTERNAL MEDICINE

## 2025-02-05 NOTE — PROGRESS NOTES
[facility]        Outpatient Follow-Up note         2/5/2025 10:58 AM    Patient Name:   Rafael Gardiner  YOB: 1935  Primary Care Physician:  Shari Mitchell MD   [facility] [place] [facility]  [facility]  [address] [address] [facility] [address]  Dept: [phone]  Loc: [phone]     Chief Complaint / Reason for follow-up : Follow Up of CKD     Interval History :  Patient seen and examined by me.   Feels ok. No cp or SOB.  No hospitalizations and no med changes      Past History :  Past Medical History:   Diagnosis Date    Acute renal insufficiency     Bladder outlet obstruction     CAD (coronary artery disease)     CKD (chronic kidney disease), stage II     DM (di[facility] mellitus) ([facility]) 2001    Gastric ulcer 1980    H/O ventral hernia repair 06/29/06    Hematuria     Hypoglycemia     S/P appendectomy     S/P rotator cuff repair     Smoking hx      Past Surgical History:   Procedure Laterality Date    CORONARY ARTERY BYPASS GRAFT          Medications :     Outpatient Medications Marked as Taking for the 2/5/25 encounter (Office Visit) with Moshe Álvarez MD   Medication Sig Dispense Refill    metoprolol tartrate (LOPRESSOR) 25 MG tablet Take 1 tablet by mouth 2 times daily      losartan (COZAAR) 25 MG tablet TAKE ONE-HALF TABLET BY  MOUTH DAILY (Patient taking differently: Only taking on Mon,wed and fri) 45 tablet 3    metFORMIN (GLUCOPHAGE) 850 MG tablet Take 1 tablet by mouth 2 times daily (before meals) (Patient taking differently: Take 500 mg by mouth 2 times daily (before meals)) 60 tablet 12    glimepiride (AMARYL) 4 MG tablet Take 1 tablet by mouth every morning (before breakfast)      NONFORMULARY Domperidone 10 mg qd      pravastatin (PRAVACHOL) 20 MG tablet Take 2 tablets by mouth daily      Docusate Sodium (COLACE PO) Take by mouth      isosorbide mononitrate (IMDUR) 120 MG extended release tablet 1 tablet daily  4

## 2025-03-14 ENCOUNTER — TELEPHONE (OUTPATIENT)
Dept: NEPHROLOGY | Age: 89
End: 2025-03-14

## 2025-03-14 NOTE — TELEPHONE ENCOUNTER
Savannah daughter phoned - renata had a renal usn done on 2-24 - states she hasn't heard anything back on results - just checking if anything needs to be done or just wait until 8-20 apt -[phone] savannah

## 2025-06-26 ENCOUNTER — TELEPHONE (OUTPATIENT)
Dept: NEPHROLOGY | Age: 89
End: 2025-06-26

## 2025-06-26 DIAGNOSIS — N20.0 NEPHROLITHIASIS: ICD-10-CM

## 2025-06-26 DIAGNOSIS — N18.32 STAGE 3B CHRONIC KIDNEY DISEASE (HCC): Primary | ICD-10-CM

## 2025-06-26 DIAGNOSIS — I10 HTN (HYPERTENSION), BENIGN: ICD-10-CM

## 2025-07-01 NOTE — TELEPHONE ENCOUNTER
I called daughter and seen if the bmp was done. She said she has to call her brother and ask him to take him. She is out of town. Her brother will call us and let us know when it is done. I faxed the order to [facility] again.

## 2025-07-02 LAB
BUN BLDV-MCNC: 57 MG/DL
CALCIUM SERPL-MCNC: 8.6 MG/DL
CHLORIDE BLD-SCNC: 104 MMOL/L
CO2: 22 MMOL/L
CREAT SERPL-MCNC: 2.74 MG/DL
EGFR: 22
GLUCOSE BLD-MCNC: 179 MG/DL
POTASSIUM SERPL-SCNC: 4.8 MMOL/L
SODIUM BLD-SCNC: 136 MMOL/L

## 2025-07-04 ENCOUNTER — RESULTS FOLLOW-UP (OUTPATIENT)
Dept: NEPHROLOGY | Age: 89
End: 2025-07-04

## 2025-07-08 LAB
BUN BLDV-MCNC: 54 MG/DL
CALCIUM SERPL-MCNC: 8.7 MG/DL
CHLORIDE BLD-SCNC: 108 MMOL/L
CO2: 21 MMOL/L
CREAT SERPL-MCNC: 2.7 MG/DL
EGFR: 22
GLUCOSE BLD-MCNC: 144 MG/DL
POTASSIUM SERPL-SCNC: 4.5 MMOL/L
SODIUM BLD-SCNC: 139 MMOL/L

## 2025-07-08 NOTE — TELEPHONE ENCOUNTER
I spoke with patients daughter she will get ahold of her brother and have him take his dad to [facility] to get labs done and he will call back to make appt. Lab order faxed

## 2025-07-11 ENCOUNTER — OFFICE VISIT (OUTPATIENT)
Dept: NEPHROLOGY | Age: 89
End: 2025-07-11
Payer: MEDICARE

## 2025-07-11 ENCOUNTER — TELEPHONE (OUTPATIENT)
Dept: NEPHROLOGY | Age: 89
End: 2025-07-11

## 2025-07-11 VITALS — SYSTOLIC BLOOD PRESSURE: 128 MMHG | DIASTOLIC BLOOD PRESSURE: 76 MMHG | HEART RATE: 67 BPM | OXYGEN SATURATION: 97 %

## 2025-07-11 DIAGNOSIS — I10 HTN (HYPERTENSION), BENIGN: ICD-10-CM

## 2025-07-11 DIAGNOSIS — N18.4 STAGE 4 CHRONIC KIDNEY DISEASE (HCC): ICD-10-CM

## 2025-07-11 DIAGNOSIS — N20.0 NEPHROLITHIASIS: Primary | ICD-10-CM

## 2025-07-11 DIAGNOSIS — E11.21 DIABETIC NEPHROPATHY WITH PROTEINURIA (HCC): ICD-10-CM

## 2025-07-11 PROCEDURE — 99214 OFFICE O/P EST MOD 30 MIN: CPT | Performed by: INTERNAL MEDICINE

## 2025-07-11 PROCEDURE — G8427 DOCREV CUR MEDS BY ELIG CLIN: HCPCS | Performed by: INTERNAL MEDICINE

## 2025-07-11 PROCEDURE — 1123F ACP DISCUSS/DSCN MKR DOCD: CPT | Performed by: INTERNAL MEDICINE

## 2025-07-11 PROCEDURE — G8420 CALC BMI NORM PARAMETERS: HCPCS | Performed by: INTERNAL MEDICINE

## 2025-07-11 PROCEDURE — G2211 COMPLEX E/M VISIT ADD ON: HCPCS | Performed by: INTERNAL MEDICINE

## 2025-07-11 PROCEDURE — 1159F MED LIST DOCD IN RCRD: CPT | Performed by: INTERNAL MEDICINE

## 2025-07-11 PROCEDURE — 1036F TOBACCO NON-USER: CPT | Performed by: INTERNAL MEDICINE

## 2025-07-11 RX ORDER — ATENOLOL 25 MG/1
25 TABLET ORAL DAILY
COMMUNITY
Start: 2025-04-30

## 2025-07-11 RX ORDER — CLOPIDOGREL BISULFATE 75 MG/1
75 TABLET ORAL DAILY
COMMUNITY

## 2025-07-11 RX ORDER — AMLODIPINE BESYLATE 5 MG/1
5 TABLET ORAL DAILY
Qty: 30 TABLET | Refills: 3 | Status: SHIPPED | OUTPATIENT
Start: 2025-07-11

## 2025-07-11 NOTE — PROGRESS NOTES
[facility]        Outpatient Follow-Up note         7/11/2025 9:19 AM    Patient Name:   Rafael Gardiner  YOB: 1935  Primary Care Physician:  Shari Mitchell MD   [facility]  [address]  [address]  [facility] [address]  Dept: [phone]  Loc: [phone]     Chief Complaint / Reason for follow-up : Follow Up of CKD     Interval History :  Patient seen and examined by me.   Feels ok. No cp or SOB.  No hospitalizations and no med changes   Needs a watchman procedure     Past History :  Past Medical History:   Diagnosis Date    Acute renal insufficiency     Bladder outlet obstruction     CAD (coronary artery disease)     CKD (chronic kidney disease), stage II     DM (di[facility] mellitus) ([facility] 2001    Gastric ulcer 1980    H/O ventral hernia repair 06/29/06    Hematuria     Hypoglycemia     S/P appendectomy     S/P rotator cuff repair     Smoking hx      Past Surgical History:   Procedure Laterality Date    CORONARY ARTERY BYPASS GRAFT          Medications :     Outpatient Medications Marked as Taking for the 7/11/25 encounter (Office Visit) with Moshe Álvarez MD   Medication Sig Dispense Refill    atenolol (TENORMIN) 25 MG tablet Take 1 tablet by mouth daily      clopidogrel (PLAVIX) 75 MG tablet Take 1 tablet by mouth daily      losartan (COZAAR) 25 MG tablet TAKE ONE-HALF TABLET BY  MOUTH DAILY (Patient taking differently: Only taking on Mon,wed and fri) 45 tablet 3    metFORMIN (GLUCOPHAGE) 850 MG tablet Take 1 tablet by mouth 2 times daily (before meals) (Patient taking differently: Take 500 mg by mouth 2 times daily (before meals)) 60 tablet 12    glimepiride (AMARYL) 4 MG tablet Take 1 tablet by mouth every morning (before breakfast)      NONFORMULARY Domperidone 10 mg qd      pravastatin (PRAVACHOL) 20 MG tablet Take 2 tablets by mouth daily      Docusate Sodium (COLACE PO) Take by

## 2025-07-11 NOTE — TELEPHONE ENCOUNTER
Called Dr Mitchell's office and spoke to Shelia. Advised that Dr Álvarez would like the Metformin D/c due to kidney function. Will await response

## 2025-07-18 LAB
BUN BLDV-MCNC: 47 MG/DL
CALCIUM SERPL-MCNC: 8.2 MG/DL
CHLORIDE BLD-SCNC: 103 MMOL/L
CO2: 21 MMOL/L
CREAT SERPL-MCNC: 2.43 MG/DL
EGFR: 25
GLUCOSE BLD-MCNC: 245 MG/DL
POTASSIUM SERPL-SCNC: 4.8 MMOL/L
SODIUM BLD-SCNC: 134 MMOL/L

## 2025-08-20 ENCOUNTER — OFFICE VISIT (OUTPATIENT)
Dept: NEPHROLOGY | Age: 89
End: 2025-08-20
Payer: MEDICARE

## 2025-08-20 VITALS
DIASTOLIC BLOOD PRESSURE: 52 MMHG | HEART RATE: 59 BPM | OXYGEN SATURATION: 98 % | SYSTOLIC BLOOD PRESSURE: 108 MMHG | WEIGHT: 134 LBS | BODY MASS INDEX: 19.79 KG/M2

## 2025-08-20 DIAGNOSIS — E11.21 DIABETIC NEPHROPATHY WITH PROTEINURIA (HCC): ICD-10-CM

## 2025-08-20 DIAGNOSIS — N18.4 STAGE 4 CHRONIC KIDNEY DISEASE (HCC): ICD-10-CM

## 2025-08-20 DIAGNOSIS — I10 HTN (HYPERTENSION), BENIGN: Primary | ICD-10-CM

## 2025-08-20 PROCEDURE — G2211 COMPLEX E/M VISIT ADD ON: HCPCS | Performed by: INTERNAL MEDICINE

## 2025-08-20 PROCEDURE — 1036F TOBACCO NON-USER: CPT | Performed by: INTERNAL MEDICINE

## 2025-08-20 PROCEDURE — 99213 OFFICE O/P EST LOW 20 MIN: CPT | Performed by: INTERNAL MEDICINE

## 2025-08-20 PROCEDURE — 1123F ACP DISCUSS/DSCN MKR DOCD: CPT | Performed by: INTERNAL MEDICINE

## 2025-08-20 PROCEDURE — G8420 CALC BMI NORM PARAMETERS: HCPCS | Performed by: INTERNAL MEDICINE

## 2025-08-20 PROCEDURE — 1159F MED LIST DOCD IN RCRD: CPT | Performed by: INTERNAL MEDICINE

## 2025-08-20 PROCEDURE — G8427 DOCREV CUR MEDS BY ELIG CLIN: HCPCS | Performed by: INTERNAL MEDICINE
